# Patient Record
Sex: MALE | Race: WHITE | NOT HISPANIC OR LATINO
[De-identification: names, ages, dates, MRNs, and addresses within clinical notes are randomized per-mention and may not be internally consistent; named-entity substitution may affect disease eponyms.]

---

## 2018-08-03 ENCOUNTER — APPOINTMENT (OUTPATIENT)
Dept: PULMONOLOGY | Facility: CLINIC | Age: 68
End: 2018-08-03

## 2018-08-08 ENCOUNTER — RX RENEWAL (OUTPATIENT)
Age: 68
End: 2018-08-08

## 2018-09-17 ENCOUNTER — APPOINTMENT (OUTPATIENT)
Dept: PULMONOLOGY | Facility: CLINIC | Age: 68
End: 2018-09-17

## 2018-10-30 ENCOUNTER — APPOINTMENT (OUTPATIENT)
Dept: PULMONOLOGY | Facility: CLINIC | Age: 68
End: 2018-10-30
Payer: MEDICARE

## 2018-10-30 VITALS
DIASTOLIC BLOOD PRESSURE: 90 MMHG | HEART RATE: 94 BPM | SYSTOLIC BLOOD PRESSURE: 134 MMHG | HEIGHT: 73 IN | RESPIRATION RATE: 12 BRPM | OXYGEN SATURATION: 96 % | WEIGHT: 180 LBS | BODY MASS INDEX: 23.86 KG/M2

## 2018-10-30 DIAGNOSIS — K44.9 DIAPHRAGMATIC HERNIA W/OUT OBSTRUCTION OR GANGRENE: ICD-10-CM

## 2018-10-30 LAB — POCT - HEMOGLOBIN (HGB), QUANTITATIVE, TRANSCUTANEOUS: 17.1

## 2018-10-30 PROCEDURE — 88738 HGB QUANT TRANSCUTANEOUS: CPT

## 2018-10-30 PROCEDURE — 94060 EVALUATION OF WHEEZING: CPT

## 2018-10-30 PROCEDURE — 94729 DIFFUSING CAPACITY: CPT

## 2018-10-30 PROCEDURE — 99214 OFFICE O/P EST MOD 30 MIN: CPT | Mod: 25

## 2018-10-30 PROCEDURE — 94750: CPT

## 2018-10-30 PROCEDURE — 71046 X-RAY EXAM CHEST 2 VIEWS: CPT

## 2018-10-30 PROCEDURE — 94726 PLETHYSMOGRAPHY LUNG VOLUMES: CPT

## 2018-11-21 ENCOUNTER — APPOINTMENT (OUTPATIENT)
Dept: PULMONOLOGY | Facility: CLINIC | Age: 68
End: 2018-11-21

## 2019-01-21 ENCOUNTER — RX RENEWAL (OUTPATIENT)
Age: 69
End: 2019-01-21

## 2019-04-01 ENCOUNTER — NON-APPOINTMENT (OUTPATIENT)
Age: 69
End: 2019-04-01

## 2019-04-01 ENCOUNTER — APPOINTMENT (OUTPATIENT)
Dept: PULMONOLOGY | Facility: CLINIC | Age: 69
End: 2019-04-01
Payer: MEDICARE

## 2019-04-01 VITALS
HEART RATE: 70 BPM | TEMPERATURE: 98.7 F | SYSTOLIC BLOOD PRESSURE: 103 MMHG | OXYGEN SATURATION: 97 % | DIASTOLIC BLOOD PRESSURE: 73 MMHG

## 2019-04-01 PROCEDURE — 94060 EVALUATION OF WHEEZING: CPT

## 2019-04-01 PROCEDURE — 94727 GAS DIL/WSHOT DETER LNG VOL: CPT

## 2019-04-01 PROCEDURE — 99214 OFFICE O/P EST MOD 30 MIN: CPT | Mod: 25

## 2019-04-01 PROCEDURE — 93000 ELECTROCARDIOGRAM COMPLETE: CPT

## 2019-04-01 PROCEDURE — 94729 DIFFUSING CAPACITY: CPT

## 2019-04-01 RX ORDER — TIOTROPIUM BROMIDE AND OLODATEROL 3.124; 2.736 UG/1; UG/1
2.5-2.5 SPRAY, METERED RESPIRATORY (INHALATION) DAILY
Qty: 4 | Refills: 3 | Status: DISCONTINUED | COMMUNITY
Start: 2018-08-08 | End: 2019-04-01

## 2019-04-01 NOTE — PHYSICAL EXAM
[General Appearance - Well Developed] : well developed [General Appearance - Well Nourished] : well nourished [Normal Conjunctiva] : the conjunctiva exhibited no abnormalities [Normal Oropharynx] : normal oropharynx [Jugular Venous Distention Increased] : there was no jugular-venous distention [Thyroid Diffuse Enlargement] : the thyroid was not enlarged [Heart Sounds] : normal S1 and S2 [Murmurs] : no murmurs present [Edema] : no peripheral edema present [Auscultation Breath Sounds / Voice Sounds] : lungs were clear to auscultation bilaterally [Lungs Percussion] : the lungs were normal to percussion [Nail Clubbing] : no clubbing of the fingernails [Cyanosis, Localized] : no localized cyanosis [Petechial Hemorrhages (___cm)] : no petechial hemorrhages [] : no ischemic changes [FreeTextEntry1] : proolongation of expiration

## 2019-04-01 NOTE — HISTORY OF PRESENT ILLNESS
[FreeTextEntry1] : Few months notes [facility]. \par Family issues then starts significant increse in smoking. Also with allergies.\par Some back tightness.\par minimal cough. Denies wheeze.

## 2019-04-01 NOTE — PROCEDURE
[FreeTextEntry1] : Pulmonary function testing.\par These data demonstrate a moderate obstructive ventilatory deficit. There is no significant bronchodilator response. Normal Lung Volumes. There is a moderate diffusion impairment. \par \par EKG ST occ PVC\par \par CT no change

## 2019-04-01 NOTE — DISCUSSION/SUMMARY
[FreeTextEntry1] : Moderate COPD relatively stable. Still smoking\par \par Pulmonary nodules stable\par \par Sinus tachycardia. Took OTC stimulant inhaler. Advised not to use.

## 2019-04-01 NOTE — ASSESSMENT
[FreeTextEntry1] : Continue present bronchodilator therapy\par Add Spiriva does not want add. med at this time. \par \par Smoking cessation options and importance discussed with patient.\par \par CT 1 year\par \par F/U pmd tachycardia

## 2019-04-02 ENCOUNTER — RX RENEWAL (OUTPATIENT)
Age: 69
End: 2019-04-02

## 2019-10-08 ENCOUNTER — APPOINTMENT (OUTPATIENT)
Dept: PULMONOLOGY | Facility: CLINIC | Age: 69
End: 2019-10-08

## 2019-11-01 ENCOUNTER — RX RENEWAL (OUTPATIENT)
Age: 69
End: 2019-11-01

## 2020-01-10 ENCOUNTER — OTHER (OUTPATIENT)
Age: 70
End: 2020-01-10

## 2020-02-25 ENCOUNTER — APPOINTMENT (OUTPATIENT)
Dept: PULMONOLOGY | Facility: CLINIC | Age: 70
End: 2020-02-25
Payer: MEDICARE

## 2020-02-25 VITALS
OXYGEN SATURATION: 94 % | DIASTOLIC BLOOD PRESSURE: 87 MMHG | HEART RATE: 102 BPM | SYSTOLIC BLOOD PRESSURE: 119 MMHG | TEMPERATURE: 98.1 F

## 2020-02-25 LAB — POCT - HEMOGLOBIN (HGB), QUANTITATIVE, TRANSCUTANEOUS: 13.3

## 2020-02-25 PROCEDURE — 88738 HGB QUANT TRANSCUTANEOUS: CPT

## 2020-02-25 PROCEDURE — 94729 DIFFUSING CAPACITY: CPT

## 2020-02-25 PROCEDURE — 94727 GAS DIL/WSHOT DETER LNG VOL: CPT

## 2020-02-25 PROCEDURE — 99214 OFFICE O/P EST MOD 30 MIN: CPT | Mod: 25

## 2020-02-25 NOTE — PHYSICAL EXAM
[No Acute Distress] : no acute distress [Normal Oropharynx] : normal oropharynx [Normal Appearance] : normal appearance [Supple] : supple [Thyroid Not Enlarged] : thyroid not enlarged [No Neck Mass] : no neck mass [No JVD] : no jvd [Normal S1, S2] : normal s1, s2 [No Murmurs] : no murmurs

## 2020-02-26 NOTE — HISTORY OF PRESENT ILLNESS
[Current] : current [TextBox_4] : Here to review results of CT scan. \par Increase in SOB. Denies cough/wheeze. Having a difficult time taking a deep breath in. \par \par Had injured back and limited.\par \par Using symbicort 2 puffs/day [TextBox_11] : 1

## 2020-02-26 NOTE — PROCEDURE
[FreeTextEntry1] : 02/26/2020\par Pulmonary function testing\par These data demonstrate a moderate obstructive ventilatory deficit. There is no significant bronchodilator response. There is elevation in the RV/TLC ratio indicative of possible air trapping. There is a moderate diffusion impairment. \par \par CT reviewed.

## 2020-03-16 ENCOUNTER — RX RENEWAL (OUTPATIENT)
Age: 70
End: 2020-03-16

## 2020-06-23 ENCOUNTER — APPOINTMENT (OUTPATIENT)
Dept: PULMONOLOGY | Facility: CLINIC | Age: 70
End: 2020-06-23
Payer: MEDICARE

## 2020-06-23 VITALS
DIASTOLIC BLOOD PRESSURE: 79 MMHG | SYSTOLIC BLOOD PRESSURE: 139 MMHG | HEART RATE: 109 BPM | OXYGEN SATURATION: 98 % | TEMPERATURE: 98.8 F

## 2020-06-23 PROCEDURE — 99406 BEHAV CHNG SMOKING 3-10 MIN: CPT

## 2020-06-23 PROCEDURE — 71046 X-RAY EXAM CHEST 2 VIEWS: CPT

## 2020-06-23 PROCEDURE — 99213 OFFICE O/P EST LOW 20 MIN: CPT

## 2020-06-23 NOTE — PROCEDURE
[FreeTextEntry1] : 02/26/2020\par Pulmonary function testing\par These data demonstrate a moderate obstructive ventilatory deficit. There is no significant bronchodilator response. There is elevation in the RV/TLC ratio indicative of possible air trapping. There is a moderate diffusion impairment. \par \par CT reviewed. Jan 2020\par \par

## 2020-06-23 NOTE — ASSESSMENT
[FreeTextEntry1] : Discontinue smoking.\par Course of prednisone.\par Course of Zithromax.\par Continue present bronchodilator therapy\par Follow-up in 2 weeks or sooner on a as needed basis.\par \par \par \par Smoking cessation options and importance discussed with patient.\par \par \par

## 2020-06-23 NOTE — HISTORY OF PRESENT ILLNESS
[Current] : current [TextBox_4] : Feels like lungs cant expand, no wheeze no cough rare mucus\par using Symbicort 2 and 2 and feels not helping , albuterol no effect.\par has not tried for years to quit smoking, last time when was six years ago , very nervous\par Feels diaphragm rigid\par [place] 1-2 flights\par Minimal cough\par NO response to albuterol.\par Smoking. [TextBox_11] : 1.5 [TextBox_13] : 50

## 2020-06-23 NOTE — PHYSICAL EXAM
[No Acute Distress] : no acute distress [Normal Oropharynx] : normal oropharynx [Normal Appearance] : normal appearance [Supple] : supple [Thyroid Not Enlarged] : thyroid not enlarged [No Neck Mass] : no neck mass [No JVD] : no jvd [Normal S1, S2] : normal s1, s2 [No Murmurs] : no murmurs [No Abnormalities] : no abnormalities [Normal to Percussion] : normal to percussion [No Cyanosis] : no cyanosis [Benign] : benign [No Clubbing] : no clubbing [No Edema] : no edema [TextBox_68] : 1+ wheeze bilaterally with significant prolongation of expiration.

## 2020-06-23 NOTE — COUNSELING
[Risk of tobacco use and health benefits of smoking cessation discussed] : Risk of tobacco use and health benefits of smoking cessation discussed [Cessation strategies including cessation program discussed] : Cessation strategies including cessation program discussed [Use of nicotine replacement therapies and other medications discussed] : Use of nicotine replacement therapies and other medications discussed [Tobacco Use Cessation Intermediate Greater Than 3 Minutes Up to 10 Minutes] : Tobacco Use Cessation Intermediate Greater Than 3 Minutes Up to 10 Minutes

## 2020-07-14 DIAGNOSIS — Z20.828 CONTACT WITH AND (SUSPECTED) EXPOSURE TO OTHER VIRAL COMMUNICABLE DISEASES: ICD-10-CM

## 2020-07-21 ENCOUNTER — APPOINTMENT (OUTPATIENT)
Dept: PULMONOLOGY | Facility: CLINIC | Age: 70
End: 2020-07-21

## 2020-07-27 ENCOUNTER — APPOINTMENT (OUTPATIENT)
Dept: DISASTER EMERGENCY | Facility: CLINIC | Age: 70
End: 2020-07-27

## 2020-07-29 LAB — SARS-COV-2 N GENE NPH QL NAA+PROBE: NOT DETECTED

## 2020-07-31 ENCOUNTER — APPOINTMENT (OUTPATIENT)
Dept: PULMONOLOGY | Facility: CLINIC | Age: 70
End: 2020-07-31
Payer: MEDICARE

## 2020-07-31 VITALS
WEIGHT: 180 LBS | HEART RATE: 96 BPM | DIASTOLIC BLOOD PRESSURE: 79 MMHG | RESPIRATION RATE: 14 BRPM | SYSTOLIC BLOOD PRESSURE: 133 MMHG | BODY MASS INDEX: 23.86 KG/M2 | OXYGEN SATURATION: 97 % | HEIGHT: 73 IN

## 2020-07-31 PROCEDURE — 94729 DIFFUSING CAPACITY: CPT

## 2020-07-31 PROCEDURE — 94060 EVALUATION OF WHEEZING: CPT

## 2020-07-31 PROCEDURE — 99213 OFFICE O/P EST LOW 20 MIN: CPT | Mod: 25

## 2020-07-31 PROCEDURE — ZZZZZ: CPT

## 2020-07-31 PROCEDURE — 94727 GAS DIL/WSHOT DETER LNG VOL: CPT

## 2020-07-31 RX ORDER — AZITHROMYCIN 250 MG/1
250 TABLET, FILM COATED ORAL
Qty: 1 | Refills: 0 | Status: DISCONTINUED | COMMUNITY
Start: 2020-06-23 | End: 2020-07-31

## 2020-07-31 RX ORDER — PREDNISONE 10 MG/1
10 TABLET ORAL
Qty: 40 | Refills: 0 | Status: DISCONTINUED | COMMUNITY
Start: 2020-06-23 | End: 2020-07-31

## 2020-07-31 NOTE — COUNSELING
[Risk of tobacco use and health benefits of smoking cessation discussed] : Risk of tobacco use and health benefits of smoking cessation discussed [Cessation strategies including cessation program discussed] : Cessation strategies including cessation program discussed [Use of nicotine replacement therapies and other medications discussed] : Use of nicotine replacement therapies and other medications discussed

## 2020-08-04 NOTE — ASSESSMENT
[FreeTextEntry1] : Discontinue smoking.  Urged.  Patient with progressive COPD.\par Continue present bronchodilator therapy\par .add spiriva 2 puffs daily gave samples\par \par Smoking cessation options and importance discussed with patient.\par r/t 3 months [place]\par Ct f/u Jan 2021\par \par

## 2020-08-04 NOTE — PROCEDURE
[FreeTextEntry1] : 07/31/2020\par Pulmonary function testing\par These data demonstrate a moderate obstructive ventilatory deficit. There is no significant bronchodilator response. There is elevation in the RV/TLC ratio indicative of possible air trapping. There is a moderate diffusion impairment. \par \par CT Jan 2020\par \par

## 2020-08-04 NOTE — PHYSICAL EXAM
[No Acute Distress] : no acute distress [Normal Oropharynx] : normal oropharynx [Normal Appearance] : normal appearance [Supple] : supple [Thyroid Not Enlarged] : thyroid not enlarged [No Neck Mass] : no neck mass [No JVD] : no jvd [Normal S1, S2] : normal s1, s2 [Normal to Percussion] : normal to percussion [No Abnormalities] : no abnormalities [No Murmurs] : no murmurs [No Clubbing] : no clubbing [Benign] : benign [No Cyanosis] : no cyanosis [No Edema] : no edema [TextBox_68] : Prolongation of expiration without active wheeze.

## 2020-08-04 NOTE — HISTORY OF PRESENT ILLNESS
[Current] : current [TextBox_4] : no wheeze  rare mucus took prednisone and antibiotic with help while on it but once was over felt breathing went back to the same sob\par using Symbicort 2 and 2  prn albuterol\par has not tried for years to quit smoking, last time when was six years ago , very nervous, still smoking chain smoking\par Feels diaphragm rigid\par [place] 1-2 flights\par Minimal cough\par NO response to albuterol.\par  [TextBox_13] : 50 [TextBox_11] : 1.5

## 2020-08-04 NOTE — PHYSICAL EXAM
[Normal Oropharynx] : normal oropharynx [No Acute Distress] : no acute distress [Supple] : supple [Thyroid Not Enlarged] : thyroid not enlarged [Normal Appearance] : normal appearance [No Neck Mass] : no neck mass [No JVD] : no jvd [Normal S1, S2] : normal s1, s2 [No Murmurs] : no murmurs [Normal to Percussion] : normal to percussion [No Abnormalities] : no abnormalities [Benign] : benign [No Clubbing] : no clubbing [No Cyanosis] : no cyanosis [No Edema] : no edema [TextBox_68] : Prolongation of expiration without active wheeze.

## 2020-08-04 NOTE — HISTORY OF PRESENT ILLNESS
[Current] : current [TextBox_4] : no wheeze  rare mucus took prednisone and antibiotic with help while on it but once was over felt breathing went back to the same sob\par using Symbicort 2 and 2  prn albuterol\par has not tried for years to quit smoking, last time when was six years ago , very nervous, still smoking chain smoking\par Feels diaphragm rigid\par [place] 1-2 flights\par Minimal cough\par NO response to albuterol.\par  [TextBox_11] : 1.5 [TextBox_13] : 50

## 2020-08-04 NOTE — DISCUSSION/SUMMARY
[FreeTextEntry1] : COPD progressive\par Status post exacerbation with resolution of wheezing.\par Current every day smoker.

## 2020-08-11 ENCOUNTER — RX RENEWAL (OUTPATIENT)
Age: 70
End: 2020-08-11

## 2020-10-05 ENCOUNTER — RX RENEWAL (OUTPATIENT)
Age: 70
End: 2020-10-05

## 2020-12-04 ENCOUNTER — APPOINTMENT (OUTPATIENT)
Dept: PULMONOLOGY | Facility: CLINIC | Age: 70
End: 2020-12-04
Payer: MEDICARE

## 2020-12-04 VITALS
OXYGEN SATURATION: 99 % | BODY MASS INDEX: 23.86 KG/M2 | HEIGHT: 73 IN | TEMPERATURE: 98.8 F | WEIGHT: 180 LBS | SYSTOLIC BLOOD PRESSURE: 135 MMHG | HEART RATE: 96 BPM | DIASTOLIC BLOOD PRESSURE: 91 MMHG

## 2020-12-04 DIAGNOSIS — J44.1 CHRONIC OBSTRUCTIVE PULMONARY DISEASE WITH (ACUTE) EXACERBATION: ICD-10-CM

## 2020-12-04 PROCEDURE — 99214 OFFICE O/P EST MOD 30 MIN: CPT

## 2020-12-04 NOTE — PHYSICAL EXAM
[No Acute Distress] : no acute distress [Normal Oropharynx] : normal oropharynx [Normal Appearance] : normal appearance [Supple] : supple [Thyroid Not Enlarged] : thyroid not enlarged [No Neck Mass] : no neck mass [No JVD] : no jvd [Normal S1, S2] : normal s1, s2 [No Murmurs] : no murmurs [Normal to Percussion] : normal to percussion [No Abnormalities] : no abnormalities [Benign] : benign [No Clubbing] : no clubbing [No Cyanosis] : no cyanosis [No Edema] : no edema [TextBox_68] : Prolongation of expiration without active wheeze.

## 2020-12-04 NOTE — CONSULT LETTER
[Dear  ___] : Dear ~TAMIKA, [Consult Letter:] : I had the pleasure of evaluating your patient, [unfilled]. [Please see my note below.] : Please see my note below. [Sincerely,] : Sincerely, [FreeTextEntry2] : Roddy Patino MD\par  [FreeTextEntry3] : Goldy Jacob MD FCCP\par

## 2020-12-04 NOTE — DISCUSSION/SUMMARY
[FreeTextEntry1] : COPD progressive\par Current every day smoker.\par Ground glass opacity likely related to inhomogeneous ventilation and atelectasis.

## 2020-12-04 NOTE — REASON FOR VISIT
[Acute] : an acute visit [Abnormal CXR/ Chest CT] : an abnormal CXR/ chest CT [COPD] : COPD [Pulmonary Nodules] : pulmonary nodules [Shortness of Breath] : shortness of breath

## 2020-12-04 NOTE — PROCEDURE
[FreeTextEntry1] : 07/31/2020\par Pulmonary function testing\par These data demonstrate a moderate obstructive ventilatory deficit. There is no significant bronchodilator response. There is elevation in the RV/TLC ratio indicative of possible air trapping. There is a moderate diffusion impairment. \par \par reviewed and discussed recent ct 10/22/2020.  Compared to prior\par \par

## 2020-12-04 NOTE — HISTORY OF PRESENT ILLNESS
[Current] : current [TextBox_4] : past few months breathing has increasing getting worse, cant get up 2 flights of stairs\par has not tried for years to quit smoking, last time when was six years ago , very nervous, still smoking chain smoking about 1 ppd\par Feels diaphragm rigid\par \par Due for prostate biopsy in January, Dr Carpenter sent him for a ct chest and brought in report\par Recently having lower back problems pain down right leg, seeing Dr Patino afraid to get a Mri\par Minimal cough, very little mucus\par No response to albuterol.\par Last visit got prednisone with help in breathing\par no recent illness \par only on Symbicort and ran out of spiriva\par Positive BARRERA. \par refuses flu shot\par \par Feels not going to stop smoking. [TextBox_11] : 1.5 [TextBox_13] : 50

## 2020-12-04 NOTE — ASSESSMENT
[FreeTextEntry1] : Discontinue smoking.  Urged.  Patient with progressive COPD.\par Continue present bronchodilator therapy\par Add spiriva 2 puffs daily gave samples\par Desires prednisone 5 mg/day. Feels much better on. Risks and benefits discussed. For trial\par For PFT and 6 minute walk\par F/U CT 6-9 months

## 2020-12-06 LAB — SARS-COV-2 N GENE NPH QL NAA+PROBE: NOT DETECTED

## 2020-12-09 ENCOUNTER — APPOINTMENT (OUTPATIENT)
Dept: PULMONOLOGY | Facility: CLINIC | Age: 70
End: 2020-12-09

## 2021-08-02 ENCOUNTER — APPOINTMENT (OUTPATIENT)
Dept: PULMONOLOGY | Facility: CLINIC | Age: 71
End: 2021-08-02
Payer: MEDICARE

## 2021-08-02 VITALS
DIASTOLIC BLOOD PRESSURE: 74 MMHG | OXYGEN SATURATION: 96 % | SYSTOLIC BLOOD PRESSURE: 129 MMHG | TEMPERATURE: 98.3 F | HEART RATE: 96 BPM

## 2021-08-02 DIAGNOSIS — R91.8 OTHER NONSPECIFIC ABNORMAL FINDING OF LUNG FIELD: ICD-10-CM

## 2021-08-02 PROCEDURE — 99214 OFFICE O/P EST MOD 30 MIN: CPT | Mod: 25

## 2021-08-02 PROCEDURE — 71046 X-RAY EXAM CHEST 2 VIEWS: CPT

## 2021-08-02 RX ORDER — OXYCODONE AND ACETAMINOPHEN 5; 325 MG/1; MG/1
5-325 TABLET ORAL
Qty: 50 | Refills: 0 | Status: ACTIVE | COMMUNITY
Start: 2021-07-14

## 2021-08-02 RX ORDER — MELOXICAM 15 MG/1
15 TABLET ORAL
Qty: 30 | Refills: 0 | Status: DISCONTINUED | COMMUNITY
Start: 2021-03-15 | End: 2021-08-02

## 2021-08-03 NOTE — DISCUSSION/SUMMARY
[FreeTextEntry1] : COPD progressive\par Current every day smoker.\par Ground glass opacity likely related to inhomogeneous ventilation and atelectasis.\par Generalized fatigue etiology not clear.

## 2021-08-03 NOTE — HISTORY OF PRESENT ILLNESS
MRN:[id number]                      After Visit Summary   3/28/2017    Mary Shipman    MRN: [id number]           Thank you!     Thank you for choosing [facility] for your care. Our goal is always to provide you with excellent care. Hearing back from our patients is one way we can continue to improve our services. Please take a few minutes to complete the written survey that you may receive in the mail after you visit with us. Thank you!        Patient Information     Date Of Birth          1985        About your hospital stay     You were admitted on:  March 28, 2017 You last received care in the:  [address] [facility]    You were discharged on:  March 29, 2017        Reason for your hospital stay       Seizure                  Who to Call     For medical emergencies, please call 911.  For non-urgent questions about your medical care, please call your primary care provider or clinic, [phone]          Attending Provider     Provider Specialty    Autumn, Blessing Cardona MD Neurology       Primary Care Provider Office Phone # Fax #    Miguel Hammer PA-C [phone]       [address] [address]        After Care Instructions     Activity       Your activity upon discharge: activity as tolerated            Diet       Follow this diet upon discharge: Orders Placed This Encounter      Advance Diet as Tolerated: Full Liquid Diet            Discharge Instructions       1) Take keppra 750mg twice daily for seizures.  2) If still feeling side effects of sedation/sleepiness, then consider decreasing or stopping topiramate and/or trazadone which can worsen these side effects.   3) Follow-up in Neurology clinic at [facility] regarding seizures & follow-up on pending tests.  4) Follow-up with PT & OT as outpatient for ongoing therapies.                  Follow-up Appointments     Adult [facility] Follow-up and recommended labs and tests        Follow-up in Neurology Clinic at [facility] in 2-3 weeks.    Appointments on [place] and/or [facility] (with [facility] or [facility] provider or service). Call [phone] if you haven't heard regarding these appointments within 7 days of discharge.                  Additional Services     NEUROLOGY ADULT REFERRAL       Your provider has referred you to: [facility]: Neurology Clinic [facility] [phone]   [email or web address]  Epilepsy or Resident clinic (Thomas Rabago Ho, [place])    Reason for Referral: Consult, post-hospital follow-up.     Please schedule follow-up in 2-3 weeks.    Please be aware that coverage of these services is subject to the terms and limitations of your health insurance plan.  Call member services at your health plan with any benefit or coverage questions.      Please bring the following with you to your appointment:    (1) Any X-Rays, CTs or MRIs which have been performed.  Contact the facility where they were done to arrange for  prior to your scheduled appointment.    (2) List of current medications  (3) This referral request   (4) Any documents/labs given to you for this referral            Occupational Therapy Referral           Physical Therapy Referral       *This therapy referral will be filtered to a centralized scheduling office at [facility] and the patient will receive a call to schedule an appointment at a [facility] location most convenient for them. *     [facility] provides Physical Therapy evaluation and treatment and many specialty services across the [facility] system.  If requesting a specialty program, please choose from the list below.    If you have not heard from the scheduling office within 2 business days, please call [phone] for all locations, with the exception of [place], please call [phone].  Treatment: Evaluation & Treatment  Special Instructions/Modalities: n/a  Special  "Programs: None    Please be aware that coverage of these services is subject to the terms and limitations of your health insurance plan.  Call member services at your health plan with any benefit or coverage questions.      **Note to Provider:  If you are referring outside of [place] for the therapy appointment, please list the name of the location in the  special instructions  above, print the referral and give to the patient to schedule the appointment.                  Pending Results     Date and Time Order Name Status Description    3/29/2017 0955 Angiotensin Converting Enzyme CSF In process     3/29/2017 0903 Viral culture In process     3/29/2017 0903 Oligoclonal banding In process     3/29/2017 0903 HSV Types 1 and 2 Qualitative PCR CSF: Tube 2 In process     3/29/2017 0903 CSF Culture Aerobic Bacterial In process     3/29/2017 0903 Gram stain In process             Statement of Approval     Ordered          03/29/17 1623  I have reviewed and agree with all the recommendations and orders detailed in this document.  EFFECTIVE NOW     Approved and electronically signed by:  Randy Olea MD             Admission Information     Date & Time Provider Department Dept. Phone    3/28/2017 Blessing Leyva MD [address] [facility] [place] [phone]      Your Vitals Were     Blood Pressure Pulse Temperature Respirations Height Weight    110/65 82 98.7  F (37.1  C) (Oral) 18 1.651 m (5' 5\") 110.2 kg (243 lb)    Pulse Oximetry BMI (Body Mass Index)                97% 40.44 kg/m2          MyChart Information     [employer] gives you secure access to your electronic health record. If you see a primary care provider, you can also send messages to your care team and make appointments. If you have questions, please call your primary care clinic.  If you do not have a primary care provider, please call [phone] and they will assist you.        Care EveryWhere ID     This is your Care EveryWhere ID. This could " be used by other organizations to access your [place] medical records  [id number]           Review of your medicines      START taking        Dose / Directions    levETIRAcetam 750 MG tablet   Commonly known as:  KEPPRA        Dose:  750 mg   Take 1 tablet (750 mg) by mouth 2 times daily   Quantity:  180 tablet   Refills:  0         CONTINUE these medicines which have NOT CHANGED        Dose / Directions    albuterol 108 (90 BASE) MCG/ACT Inhaler   Commonly known as:  PROAIR HFA/PROVENTIL HFA/VENTOLIN HFA   Used for:  Intermittent asthma, uncomplicated        Dose:  2 puff   Inhale 2 puffs into the lungs every 6 hours as needed for shortness of breath / dyspnea or wheezing   Quantity:  3 Inhaler   Refills:  0       fexofenadine-pseudoePHEDrine 180-240 MG per 24 hr tablet   Commonly known as:  ALLEGRA-D 24        Dose:  1 tablet   Take 1 tablet by mouth daily   Refills:  0       fluticasone 50 MCG/ACT spray   Commonly known as:  FLONASE        Dose:  1-2 spray   Spray 1-2 sprays into both nostrils daily as needed for rhinitis or allergies   Refills:  0       ibuprofen 400-800 mg tablet   Commonly known as:  ADVIL,MOTRIN   Used for:  Abdominal pain        Dose:  400-800 mg   Take 1-2 tablets by mouth every 6 hours as needed (cramping).   Quantity:  30 tablet   Refills:  0       LANsoprazole 30 MG CR capsule   Commonly known as:  PREVACID   Used for:  Gastroesophageal reflux disease without esophagitis        Dose:  30 mg   Take 1 capsule (30 mg) by mouth daily Take 30-60 minutes before a meal.   Quantity:  90 capsule   Refills:  0       levalbuterol 1.25 MG/0.5ML Nebu neb solution   Commonly known as:  XOPENEX CONC        Dose:  1.25 mg   Take 1.25 mg by nebulization every 6 hours as needed for wheezing   Refills:  0       metFORMIN 500 MG 24 hr tablet   Commonly known as:  GLUCOPHAGE-XR   Used for:  Polycystic ovaries        Dose:  500 mg   Take 1 tablet (500 mg) by mouth 2 times daily (with meals)   Quantity:   180 tablet   Refills:  3       mometasone-formoterol 200-5 MCG/ACT oral inhaler   Commonly known as:  DULERA   Used for:  Intermittent asthma, uncomplicated        Dose:  2 puff   Inhale 2 puffs into the lungs 2 times daily   Quantity:  39 g   Refills:  0       montelukast 10 MG tablet   Commonly known as:  SINGULAIR   Used for:  Intermittent asthma, uncomplicated        Dose:  10 mg   Take 1 tablet (10 mg) by mouth At Bedtime   Quantity:  90 tablet   Refills:  1       polyethylene glycol Packet   Commonly known as:  MIRALAX/GLYCOLAX        Dose:  17 g   Take 17 g by mouth daily as needed for constipation   Refills:  0       ranitidine 150 MG tablet   Commonly known as:  ZANTAC        Dose:  150 mg   Take 150 mg by mouth 2 times daily as needed   Quantity:  60 tablet   Refills:  1       SERTRALINE HCL PO        Dose:  150 mg   Take 150 mg by mouth daily 100mg x 1.5 tabs   Refills:  0       topiramate 25 MG tablet   Commonly known as:  TOPAMAX   Used for:  Other migraine without status migrainosus, not intractable        Take 1 tablet (25 mg) at bedtime for 1 week, then 1 tablet twice daily for 1 week, then 1 tablet in AM and 2 in PM for 1 week, then 2 tablets twice daily.   Quantity:  70 tablet   Refills:  0       TRAZODONE HCL PO        Dose:   mg   Take  mg by mouth At Bedtime   Refills:  0            Where to get your medicines      These medications were sent to [facility] [place], [place] - [address] [address]     Phone:  [phone]     levETIRAcetam 750 MG tablet                Protect others around you: Learn how to safely use, store and throw away your medicines at www.disposemymeds.org.             Medication List: This is a list of all your medications and when to take them. Check marks below indicate your daily home schedule. Keep this list as a reference.      Medications           Morning Afternoon Evening Bedtime As Needed     albuterol 108 (90 BASE) MCG/ACT Inhaler   Commonly known as:  PROAIR HFA/PROVENTIL HFA/VENTOLIN HFA   Inhale 2 puffs into the lungs every 6 hours as needed for shortness of breath / dyspnea or wheezing                                fexofenadine-pseudoePHEDrine 180-240 MG per 24 hr tablet   Commonly known as:  ALLEGRA-D 24   Take 1 tablet by mouth daily   Last time this was given:  1 tablet on 3/29/2017  9:02 AM                                fluticasone 50 MCG/ACT spray   Commonly known as:  FLONASE   Spray 1-2 sprays into both nostrils daily as needed for rhinitis or allergies                                ibuprofen 400-800 mg tablet   Commonly known as:  ADVIL,MOTRIN   Take 1-2 tablets by mouth every 6 hours as needed (cramping).                                LANsoprazole 30 MG CR capsule   Commonly known as:  PREVACID   Take 1 capsule (30 mg) by mouth daily Take 30-60 minutes before a meal.   Last time this was given:  30 mg on 3/29/2017  9:01 AM                                levalbuterol 1.25 MG/0.5ML Nebu neb solution   Commonly known as:  XOPENEX CONC   Take 1.25 mg by nebulization every 6 hours as needed for wheezing                                levETIRAcetam 750 MG tablet   Commonly known as:  KEPPRA   Take 1 tablet (750 mg) by mouth 2 times daily   Last time this was given:  1,000 mg on 3/29/2017  9:03 AM                                metFORMIN 500 MG 24 hr tablet   Commonly known as:  GLUCOPHAGE-XR   Take 1 tablet (500 mg) by mouth 2 times daily (with meals)   Last time this was given:  500 mg on 3/29/2017  9:01 AM                                mometasone-formoterol 200-5 MCG/ACT oral inhaler   Commonly known as:  DULERA   Inhale 2 puffs into the lungs 2 times daily                                montelukast 10 MG tablet   Commonly known as:  SINGULAIR   Take 1 tablet (10 mg) by mouth At Bedtime   Last time this was given:  10 mg on 3/28/2017  9:34 PM                                polyethylene  glycol Packet   Commonly known as:  MIRALAX/GLYCOLAX   Take 17 g by mouth daily as needed for constipation                                ranitidine 150 MG tablet   Commonly known as:  ZANTAC   Take 150 mg by mouth 2 times daily as needed                                SERTRALINE HCL PO   Take 150 mg by mouth daily 100mg x 1.5 tabs   Last time this was given:  150 mg on 3/29/2017  9:01 AM                                topiramate 25 MG tablet   Commonly known as:  TOPAMAX   Take 1 tablet (25 mg) at bedtime for 1 week, then 1 tablet twice daily for 1 week, then 1 tablet in AM and 2 in PM for 1 week, then 2 tablets twice daily.   Last time this was given:  25 mg on 3/28/2017  9:34 PM                                TRAZODONE HCL PO   Take  mg by mouth At Bedtime                                   [TextBox_4] : increase in sob for past 3 months, feels it happened after the Moderna shot, cant get up 2 flights of stairs\par feeling exhausted\par Smoking.\par using Symbicort only, not using spiriva, thought you could not use them together\par still on prednisone 5 mg for past 2 months\par \par

## 2021-08-03 NOTE — PROCEDURE
[FreeTextEntry1] : \par \par \par 07/31/2020\par Pulmonary function testing\par These data demonstrate a moderate obstructive ventilatory deficit. There is no significant bronchodilator response. There is elevation in the RV/TLC ratio indicative of possible air trapping. There is a moderate diffusion impairment. \par \par reviewed and discussed recent ct 10/22/2020.  Compared to prior\par \par

## 2021-08-11 ENCOUNTER — APPOINTMENT (OUTPATIENT)
Dept: PULMONOLOGY | Facility: CLINIC | Age: 71
End: 2021-08-11
Payer: MEDICARE

## 2021-08-11 ENCOUNTER — NON-APPOINTMENT (OUTPATIENT)
Age: 71
End: 2021-08-11

## 2021-08-11 VITALS
HEART RATE: 137 BPM | HEIGHT: 73 IN | DIASTOLIC BLOOD PRESSURE: 91 MMHG | OXYGEN SATURATION: 96 % | TEMPERATURE: 97.5 F | SYSTOLIC BLOOD PRESSURE: 127 MMHG | RESPIRATION RATE: 17 BRPM

## 2021-08-11 VITALS — HEART RATE: 100 BPM

## 2021-08-11 DIAGNOSIS — R00.0 TACHYCARDIA, UNSPECIFIED: ICD-10-CM

## 2021-08-11 LAB — POCT - HEMOGLOBIN (HGB), QUANTITATIVE, TRANSCUTANEOUS: 17.2

## 2021-08-11 PROCEDURE — 94010 BREATHING CAPACITY TEST: CPT

## 2021-08-11 PROCEDURE — 99214 OFFICE O/P EST MOD 30 MIN: CPT | Mod: 25

## 2021-08-11 PROCEDURE — 36415 COLL VENOUS BLD VENIPUNCTURE: CPT

## 2021-08-11 PROCEDURE — ZZZZZ: CPT

## 2021-08-11 PROCEDURE — 88738 HGB QUANT TRANSCUTANEOUS: CPT

## 2021-08-11 PROCEDURE — 94726 PLETHYSMOGRAPHY LUNG VOLUMES: CPT

## 2021-08-11 PROCEDURE — 94729 DIFFUSING CAPACITY: CPT

## 2021-08-11 PROCEDURE — 93000 ELECTROCARDIOGRAM COMPLETE: CPT

## 2021-08-11 NOTE — HISTORY OF PRESENT ILLNESS
[TextBox_4] : Has appt. with Dr Patino august 16 th\par Having persistent dyspnea on exertion.\par No cough\par Continues smoking\par Notes tachycardia with exertion.\par Denies chest discomfort.

## 2021-08-11 NOTE — PROCEDURE
[FreeTextEntry1] : Ct chest 8/5/2021 reviewed and discussed with pt\par \par \par Electrocardiogram reveals sinus tachycardia\par \par 08/11/2021\par Pulmonary function testing\par These data demonstrate a moderate obstructive ventilatory deficit. There is evidence of mild hyperinflation. Airway resistance is significantly increased. There is a moderate diffusion impairment. \par Compared to prior study of July 31, 2020 there is mild decrement in FEV1 increase in FVC and no significant change in diffusion capacity.\par \par \par

## 2021-08-11 NOTE — DISCUSSION/SUMMARY
[FreeTextEntry1] : COPD progressive\par Current every day smoker.  Continues to smoke.\par pulmonary nodules.\par Mild bronchiectasis.\par Generalized fatigue etiology not clear.\par Sinus tachycardia.

## 2021-08-11 NOTE — ASSESSMENT
[FreeTextEntry1] : Discontinue smoking.  Urged.  Patient with progressive COPD.\par Continue present bronchodilator therapy\par cont spiriva, symbicort\par Continue prednisone 5 mg/day.  \par Cardiology evaluation as soon as possible.\par Send CBC and D-dimer.\par \par Follow-up with primary care doctor for routine labs and evaluation.

## 2021-08-13 LAB
BASOPHILS # BLD AUTO: 0.04 K/UL
BASOPHILS NFR BLD AUTO: 0.7 %
DEPRECATED D DIMER PPP IA-ACNC: <150 NG/ML DDU
EOSINOPHIL # BLD AUTO: 0.03 K/UL
EOSINOPHIL NFR BLD AUTO: 0.5 %
HCT VFR BLD CALC: 46.6 %
HGB BLD-MCNC: 16.1 G/DL
IMM GRANULOCYTES NFR BLD AUTO: 0.3 %
LYMPHOCYTES # BLD AUTO: 1.94 K/UL
LYMPHOCYTES NFR BLD AUTO: 31.9 %
MAN DIFF?: NORMAL
MCHC RBC-ENTMCNC: 33.7 PG
MCHC RBC-ENTMCNC: 34.5 GM/DL
MCV RBC AUTO: 97.5 FL
MONOCYTES # BLD AUTO: 0.45 K/UL
MONOCYTES NFR BLD AUTO: 7.4 %
NEUTROPHILS # BLD AUTO: 3.6 K/UL
NEUTROPHILS NFR BLD AUTO: 59.2 %
PLATELET # BLD AUTO: 255 K/UL
RBC # BLD: 4.78 M/UL
RBC # FLD: 14.7 %
WBC # FLD AUTO: 6.08 K/UL

## 2021-09-14 ENCOUNTER — RX RENEWAL (OUTPATIENT)
Age: 71
End: 2021-09-14

## 2021-10-05 ENCOUNTER — APPOINTMENT (OUTPATIENT)
Dept: ELECTROPHYSIOLOGY | Facility: CLINIC | Age: 71
End: 2021-10-05

## 2021-10-28 ENCOUNTER — APPOINTMENT (OUTPATIENT)
Dept: UROLOGY | Facility: CLINIC | Age: 71
End: 2021-10-28
Payer: MEDICARE

## 2021-10-28 VITALS
SYSTOLIC BLOOD PRESSURE: 129 MMHG | TEMPERATURE: 98.7 F | HEART RATE: 92 BPM | DIASTOLIC BLOOD PRESSURE: 87 MMHG | WEIGHT: 180 LBS | BODY MASS INDEX: 23.86 KG/M2 | HEIGHT: 73 IN | RESPIRATION RATE: 17 BRPM

## 2021-10-28 DIAGNOSIS — Z85.51 PERSONAL HISTORY OF MALIGNANT NEOPLASM OF BLADDER: ICD-10-CM

## 2021-10-28 PROCEDURE — 99204 OFFICE O/P NEW MOD 45 MIN: CPT

## 2021-10-28 PROCEDURE — 99406 BEHAV CHNG SMOKING 3-10 MIN: CPT

## 2021-10-28 RX ORDER — DIAZEPAM 5 MG/1
5 TABLET ORAL
Qty: 1 | Refills: 0 | Status: ACTIVE | COMMUNITY
Start: 2021-10-28 | End: 1900-01-01

## 2021-10-29 LAB
APPEARANCE: CLEAR
BACTERIA: NEGATIVE
BILIRUBIN URINE: NEGATIVE
BLOOD URINE: NEGATIVE
CALCIUM OXALATE CRYSTALS: ABNORMAL
COLOR: YELLOW
GLUCOSE QUALITATIVE U: NEGATIVE
HYALINE CASTS: 0 /LPF
KETONES URINE: NEGATIVE
LEUKOCYTE ESTERASE URINE: NEGATIVE
MICROSCOPIC-UA: NORMAL
NITRITE URINE: NEGATIVE
PH URINE: 6
PROTEIN URINE: NORMAL
RED BLOOD CELLS URINE: 1 /HPF
SPECIFIC GRAVITY URINE: 1.02
SQUAMOUS EPITHELIAL CELLS: 4 /HPF
UROBILINOGEN URINE: NORMAL
WHITE BLOOD CELLS URINE: 1 /HPF

## 2021-10-30 LAB — URINE CYTOLOGY: NORMAL

## 2021-11-01 LAB — BACTERIA UR CULT: NORMAL

## 2021-11-10 ENCOUNTER — APPOINTMENT (OUTPATIENT)
Dept: PULMONOLOGY | Facility: CLINIC | Age: 71
End: 2021-11-10

## 2021-12-10 ENCOUNTER — OUTPATIENT (OUTPATIENT)
Dept: OUTPATIENT SERVICES | Facility: HOSPITAL | Age: 71
LOS: 1 days | End: 2021-12-10
Payer: MEDICARE

## 2021-12-10 ENCOUNTER — APPOINTMENT (OUTPATIENT)
Dept: UROLOGY | Facility: CLINIC | Age: 71
End: 2021-12-10
Payer: MEDICARE

## 2021-12-10 DIAGNOSIS — R35.0 FREQUENCY OF MICTURITION: ICD-10-CM

## 2021-12-10 DIAGNOSIS — R97.20 ELEVATED PROSTATE, SPECIFIC ANTIGEN [PSA]: ICD-10-CM

## 2021-12-10 DIAGNOSIS — Z85.51 PERSONAL HISTORY OF MALIGNANT NEOPLASM OF BLADDER: ICD-10-CM

## 2021-12-10 DIAGNOSIS — R97.20 ELEVATED PROSTATE SPECIFIC ANTIGEN [PSA]: ICD-10-CM

## 2021-12-10 DIAGNOSIS — F17.200 NICOTINE DEPENDENCE, UNSPECIFIED, UNCOMPLICATED: ICD-10-CM

## 2021-12-10 PROCEDURE — 52000 CYSTOURETHROSCOPY: CPT

## 2021-12-10 PROCEDURE — 99406 BEHAV CHNG SMOKING 3-10 MIN: CPT

## 2021-12-10 PROCEDURE — 99214 OFFICE O/P EST MOD 30 MIN: CPT | Mod: 25

## 2021-12-15 ENCOUNTER — APPOINTMENT (OUTPATIENT)
Dept: PULMONOLOGY | Facility: CLINIC | Age: 71
End: 2021-12-15
Payer: MEDICARE

## 2021-12-15 VITALS
OXYGEN SATURATION: 97 % | DIASTOLIC BLOOD PRESSURE: 97 MMHG | TEMPERATURE: 98.2 F | SYSTOLIC BLOOD PRESSURE: 155 MMHG | HEART RATE: 100 BPM

## 2021-12-15 PROCEDURE — 99213 OFFICE O/P EST LOW 20 MIN: CPT

## 2021-12-15 RX ORDER — ALBUTEROL SULFATE 90 UG/1
108 (90 BASE) INHALANT RESPIRATORY (INHALATION)
Qty: 1 | Refills: 5 | Status: DISCONTINUED | COMMUNITY
Start: 2019-04-02 | End: 2021-12-15

## 2021-12-15 RX ORDER — ALBUTEROL SULFATE 90 UG/1
108 (90 BASE) AEROSOL, METERED RESPIRATORY (INHALATION)
Qty: 3 | Refills: 1 | Status: ACTIVE | COMMUNITY
Start: 2021-12-15 | End: 1900-01-01

## 2021-12-15 RX ORDER — ALBUTEROL SULFATE 90 UG/1
108 (90 BASE) INHALANT RESPIRATORY (INHALATION)
Qty: 8.5 | Refills: 0 | Status: DISCONTINUED | COMMUNITY
Start: 2020-08-11 | End: 2021-12-15

## 2021-12-15 NOTE — PROCEDURE
[FreeTextEntry1] : Ct chest August 2021 reviewed\par \par \par 07/31/2020\par Pulmonary function testing\par These data demonstrate a moderate obstructive ventilatory deficit. There is no significant bronchodilator response. There is elevation in the RV/TLC ratio indicative of possible air trapping. There is a moderate diffusion impairment. \par \par reviewed and discussed recent ct 10/22/2020.  Compared to prior\par \par

## 2021-12-15 NOTE — PHYSICAL EXAM
[No Acute Distress] : no acute distress [Normal Oropharynx] : normal oropharynx [Normal Appearance] : normal appearance [Supple] : supple [Thyroid Not Enlarged] : thyroid not enlarged [No Neck Mass] : no neck mass [No JVD] : no jvd [Normal S1, S2] : normal s1, s2 [No Murmurs] : no murmurs [Normal to Percussion] : normal to percussion [No Abnormalities] : no abnormalities [Benign] : benign [No Clubbing] : no clubbing [No Cyanosis] : no cyanosis [No Edema] : no edema [TextBox_68] : Prolongation of expiration with occ rhonchi and wheeze.

## 2021-12-15 NOTE — DISCUSSION/SUMMARY
[FreeTextEntry1] : COPD progressive\par Current every day smoker.\par pulmonary nodules\par mild bronchiectasis \par

## 2021-12-15 NOTE — HISTORY OF PRESENT ILLNESS
[TextBox_4] : increase in sob for past 6 months, feels it happened after the Moderna shot, cant get up 1 Flights of stairs getting worse\par had labs and heart checked from Dr Carpenter\par Smoking. same no decrease\par using Symbicort did use the spiriva samples, no longer\par ran out of prednisone 2 months ago, was taking low dose\par no cough or mucus, slight wheeze\par has lower back stenosis with pain\par \par \par refuses flu shot\par \par plans to get moderna booster\par \par

## 2021-12-15 NOTE — ASSESSMENT
[FreeTextEntry1] : Discontinue smoking.  Urged.  Patient with progressive COPD.\par Trial Breztri 2 puffs bid samples \par For PFT and 6 minute walk on r/t 6 weeks\par ct chest 1 year from last 8/5/21\par

## 2022-01-20 ENCOUNTER — APPOINTMENT (OUTPATIENT)
Dept: PULMONOLOGY | Facility: CLINIC | Age: 72
End: 2022-01-20
Payer: MEDICARE

## 2022-01-20 ENCOUNTER — RESULT CHARGE (OUTPATIENT)
Age: 72
End: 2022-01-20

## 2022-01-20 VITALS
DIASTOLIC BLOOD PRESSURE: 76 MMHG | SYSTOLIC BLOOD PRESSURE: 130 MMHG | HEIGHT: 73 IN | WEIGHT: 180 LBS | RESPIRATION RATE: 15 BRPM | HEART RATE: 75 BPM | BODY MASS INDEX: 23.86 KG/M2 | OXYGEN SATURATION: 96 % | TEMPERATURE: 98.5 F

## 2022-01-20 LAB — POCT - HEMOGLOBIN (HGB), QUANTITATIVE, TRANSCUTANEOUS: 14.7

## 2022-01-20 PROCEDURE — 94729 DIFFUSING CAPACITY: CPT

## 2022-01-20 PROCEDURE — 94060 EVALUATION OF WHEEZING: CPT

## 2022-01-20 PROCEDURE — 88738 HGB QUANT TRANSCUTANEOUS: CPT | Mod: 59

## 2022-01-20 PROCEDURE — 87811 SARS-COV-2 COVID19 W/OPTIC: CPT

## 2022-01-20 PROCEDURE — 94726 PLETHYSMOGRAPHY LUNG VOLUMES: CPT

## 2022-03-02 ENCOUNTER — APPOINTMENT (OUTPATIENT)
Dept: PULMONOLOGY | Facility: CLINIC | Age: 72
End: 2022-03-02
Payer: MEDICARE

## 2022-03-02 VITALS
OXYGEN SATURATION: 94 % | TEMPERATURE: 97.8 F | HEART RATE: 112 BPM | DIASTOLIC BLOOD PRESSURE: 94 MMHG | SYSTOLIC BLOOD PRESSURE: 149 MMHG

## 2022-03-02 PROCEDURE — 99214 OFFICE O/P EST MOD 30 MIN: CPT

## 2022-03-02 PROCEDURE — 99406 BEHAV CHNG SMOKING 3-10 MIN: CPT

## 2022-03-03 NOTE — DISCUSSION/SUMMARY
[FreeTextEntry1] : COPD progressive,increase in SOB\par Current every day smoker.\par pulmonary nodules\par mild bronchiectasis \par

## 2022-03-03 NOTE — ASSESSMENT
[FreeTextEntry1] : Discontinue smoking.  Urged.  Patient with progressive COPD.\par \par add spiriva to Symbicort or change to Breztri\par if no improvement will go back on prednisone 10 mg daily\par Aware of long term side effects of prednisone. \par ct chest 1 year from last 8/5/21\par \par 35 minutes spent in evaluation review of studies and management.

## 2022-03-03 NOTE — HISTORY OF PRESENT ILLNESS
[TextBox_4] : \par Feels breathing has gotten worse\par \par heart checked from Dr Patino, had stress test\par Smoking. same no decrease\par using Symbicort only\par not taking prednisone 2 months ago stopped\par no cough or mucus, slight wheeze\par \par Had ST. \par \par \par \par

## 2022-03-03 NOTE — PROCEDURE
[FreeTextEntry1] : PFT 1/20/22 reviewed and discussed\par \par Ct chest August 2021 reviewed\par \par \par \par \par

## 2022-03-03 NOTE — PHYSICAL EXAM
[No Acute Distress] : no acute distress [Normal Oropharynx] : normal oropharynx [Supple] : supple [Normal Appearance] : normal appearance [Thyroid Not Enlarged] : thyroid not enlarged [No Neck Mass] : no neck mass [No JVD] : no jvd [Normal S1, S2] : normal s1, s2 [No Murmurs] : no murmurs [Normal to Percussion] : normal to percussion [No Abnormalities] : no abnormalities [Benign] : benign [No Clubbing] : no clubbing [No Cyanosis] : no cyanosis [No Edema] : no edema [TextBox_68] : Prolongation of expiration with occ rhonchi and wheeze.

## 2022-08-01 ENCOUNTER — TRANSCRIPTION ENCOUNTER (OUTPATIENT)
Age: 72
End: 2022-08-01

## 2022-08-01 ENCOUNTER — OUTPATIENT (OUTPATIENT)
Dept: OUTPATIENT SERVICES | Facility: HOSPITAL | Age: 72
LOS: 1 days | End: 2022-08-01
Payer: MEDICARE

## 2022-08-01 VITALS
RESPIRATION RATE: 18 BRPM | OXYGEN SATURATION: 99 % | SYSTOLIC BLOOD PRESSURE: 145 MMHG | DIASTOLIC BLOOD PRESSURE: 80 MMHG | HEART RATE: 65 BPM

## 2022-08-01 VITALS
OXYGEN SATURATION: 98 % | RESPIRATION RATE: 16 BRPM | TEMPERATURE: 98 F | SYSTOLIC BLOOD PRESSURE: 156 MMHG | DIASTOLIC BLOOD PRESSURE: 83 MMHG | HEART RATE: 107 BPM

## 2022-08-01 DIAGNOSIS — R06.02 SHORTNESS OF BREATH: ICD-10-CM

## 2022-08-01 LAB
ALBUMIN SERPL ELPH-MCNC: 4.3 G/DL — SIGNIFICANT CHANGE UP (ref 3.3–5)
ALP SERPL-CCNC: 38 U/L — LOW (ref 40–120)
ALT FLD-CCNC: 22 U/L — SIGNIFICANT CHANGE UP (ref 10–45)
ANION GAP SERPL CALC-SCNC: 11 MMOL/L — SIGNIFICANT CHANGE UP (ref 5–17)
AST SERPL-CCNC: 20 U/L — SIGNIFICANT CHANGE UP (ref 10–40)
BILIRUB SERPL-MCNC: 0.8 MG/DL — SIGNIFICANT CHANGE UP (ref 0.2–1.2)
BUN SERPL-MCNC: 14 MG/DL — SIGNIFICANT CHANGE UP (ref 7–23)
CALCIUM SERPL-MCNC: 8.8 MG/DL — SIGNIFICANT CHANGE UP (ref 8.4–10.5)
CHLORIDE SERPL-SCNC: 108 MMOL/L — SIGNIFICANT CHANGE UP (ref 96–108)
CO2 SERPL-SCNC: 21 MMOL/L — LOW (ref 22–31)
CREAT SERPL-MCNC: 0.98 MG/DL — SIGNIFICANT CHANGE UP (ref 0.5–1.3)
EGFR: 81 ML/MIN/1.73M2 — SIGNIFICANT CHANGE UP
GLUCOSE SERPL-MCNC: 95 MG/DL — SIGNIFICANT CHANGE UP (ref 70–99)
HCT VFR BLD CALC: 43.4 % — SIGNIFICANT CHANGE UP (ref 39–50)
HGB BLD-MCNC: 15.3 G/DL — SIGNIFICANT CHANGE UP (ref 13–17)
MCHC RBC-ENTMCNC: 34.1 PG — HIGH (ref 27–34)
MCHC RBC-ENTMCNC: 35.3 GM/DL — SIGNIFICANT CHANGE UP (ref 32–36)
MCV RBC AUTO: 96.7 FL — SIGNIFICANT CHANGE UP (ref 80–100)
NRBC # BLD: 0 /100 WBCS — SIGNIFICANT CHANGE UP (ref 0–0)
PLATELET # BLD AUTO: 253 K/UL — SIGNIFICANT CHANGE UP (ref 150–400)
POTASSIUM SERPL-MCNC: 4.4 MMOL/L — SIGNIFICANT CHANGE UP (ref 3.5–5.3)
POTASSIUM SERPL-SCNC: 4.4 MMOL/L — SIGNIFICANT CHANGE UP (ref 3.5–5.3)
PROT SERPL-MCNC: 7.4 G/DL — SIGNIFICANT CHANGE UP (ref 6–8.3)
RBC # BLD: 4.49 M/UL — SIGNIFICANT CHANGE UP (ref 4.2–5.8)
RBC # FLD: 13.9 % — SIGNIFICANT CHANGE UP (ref 10.3–14.5)
SODIUM SERPL-SCNC: 140 MMOL/L — SIGNIFICANT CHANGE UP (ref 135–145)
WBC # BLD: 5.46 K/UL — SIGNIFICANT CHANGE UP (ref 3.8–10.5)
WBC # FLD AUTO: 5.46 K/UL — SIGNIFICANT CHANGE UP (ref 3.8–10.5)

## 2022-08-01 PROCEDURE — 93454 CORONARY ARTERY ANGIO S&I: CPT

## 2022-08-01 PROCEDURE — 93010 ELECTROCARDIOGRAM REPORT: CPT

## 2022-08-01 PROCEDURE — C1769: CPT

## 2022-08-01 PROCEDURE — 85027 COMPLETE CBC AUTOMATED: CPT

## 2022-08-01 PROCEDURE — 93005 ELECTROCARDIOGRAM TRACING: CPT

## 2022-08-01 PROCEDURE — 36415 COLL VENOUS BLD VENIPUNCTURE: CPT

## 2022-08-01 PROCEDURE — 93454 CORONARY ARTERY ANGIO S&I: CPT | Mod: 26

## 2022-08-01 PROCEDURE — C1887: CPT

## 2022-08-01 PROCEDURE — 80053 COMPREHEN METABOLIC PANEL: CPT

## 2022-08-01 PROCEDURE — C1894: CPT

## 2022-08-01 NOTE — ASU PATIENT PROFILE, ADULT - NS PRO MODE OF ARRIVAL
September 24, 2020       Shelton Chris MD  [address]  [facility] [address]  Via In Basket      Patient: Mark Anthony Urrutia   YOB: 1956   Date of Visit: 9/22/2020       Dear Dr. Chris:    I saw your patient, Nael Urrutia, for an evaluation. Below are my notes for this visit with him.    If you have questions, please do not hesitate to call me.          Sincerely,        Clyde Lubin MD        CC: No Recipients  Clyde Lubin MD  9/24/2020 12:05 PM  Sign when Signing Visit    [facility]  SURGERY HISTORY AND PHYSICAL    CHIEF COMPLAINT:  Consultation (Subcutaneous nodule of right upper extremity)       HISTORY OF PRESENT ILLNESS:    Mark Anthony Urrutia is a 64 year old male seen today for right axillary hidradenitis. It has been present for years and has required a fair amount of treatment. He has one sinus and area that appears to be most symptomatic at the present time. This likely has been chronically infected. His use of crutches continually irritates this area leading to bleeding and drainage from the friction.       HISTORIES:  ALLERGIES:   Allergen Reactions   • Augmentin [Amoxicillin-Pot Clavulanate] CARDIAC DISTURBANCES and SHORTNESS OF BREATH     Current Outpatient Medications   Medication Sig Dispense Refill   • benzoyl peroxide (BPO-5 Wash) 5 % external liquid Apply to affected areas daily. Leave on for 1 minute. Rinse off completely. May bleach fabrics. 226 g 11   • doxycycline hyclate (VIBRA-TABS) 100 MG tablet Take one by mouth twice a day in the middle of a meal. 60 tablet 3   • lisinopril-hydroCHLOROthiazide (ZESTORETIC) 10-12.5 MG per tablet Take 1 tablet by mouth daily. 90 tablet 1   • IBUPROFEN PO Take by mouth as needed.     • gabapentin (NEURONTIN) 600 MG tablet Take 2 tablets by mouth at bedtime. 60 tablet 11   • acetaminophen (TYLENOL) 500 MG tablet Take 500 mg by mouth every 6 hours as needed for Pain.     • MELATONIN PO Indications: Take two tablets at  night     • Cholecalciferol (VITAMIN D3) 5000 units capsule Take 2 capsules by mouth daily.     • Ascorbic Acid (VITAMIN C) 500 MG tablet Take 2,000 mg by mouth daily.      • Multiple Vitamin (MULTI VITAMIN DAILY) Tab Take 1 tablet by mouth daily.     • aspirin 81 MG tablet Take 81 mg by mouth daily.     • oxyCODONE-acetaminophen (PERCOCET) 5-325 MG per tablet Take 1 tablet by mouth every 6 hours as needed for Pain (Not to exceed 4000 mg acetaminophen per day). Indication:  Kidney stones 15 tablet 0     No current facility-administered medications for this visit.      Past Medical History:   Diagnosis Date   • Arthritis    • Blood clot associated with vein wall inflammation     left leg    • Chronic pain     hip and knee   • Colon cancer (CMS/HCC)    • Fracture     left wrist   • Gastroesophageal reflux disease    • High cholesterol    • Kidney stone    • Malignant neoplasm (CMS/HCC)     skin face, basal cell removed   • Pneumonia    • Pulmonary hypertension (CMS/HCC)    • Sinusitis, chronic    • Sleep apnea     bi pap Full mask   • Tenosynovitis of ankle     left     Past Surgical History:   Procedure Laterality Date   • Appendectomy  1/23/2016    converted to open ileocecectomy   • Back surgery     • Cardiac catherization  04/18/2008    normal coronaries, mod pulm HTN, normal valves.    • Colonoscopy  5/2016    repeat 1 year per Dr. Foote for hx: colon cancer   • Colonoscopy  05/22/2017    [facility], repeat in three years (2020)   • Colonoscopy  12/10/2019   • Egd  10/01/2019   • Ir implantable port vein access  2016    left chest    • Knee scope,shave articular cart Left    • Right colectomy Right 01/23/2016    Ileocecectomy for colon cancer.    • Rotator cuff repair Right 2010   • Skin biopsy      basal cell of face   • Wrist surgery Left      accident- severed tendon and artery in wrist     Social History     Tobacco Use   Smoking Status Former Smoker   • Packs/day: 0.00   • Types: Cigarettes   • Quit  date: 1993   • Years since quittin.7   Smokeless Tobacco Never Used     Social History     Substance and Sexual Activity   Alcohol Use No   • Frequency: Never   • Drinks per session: 1 or 2   • Binge frequency: Never    Comment: quit drinking        REVIEW OF SYSTEMS:    Constitutional:  Patient denies fever, chills, tiredness or malaise.    Eyes:  Denies change in visual acuity, pain, burning or itching.    Immunologic:  Denies hives, seasonal allergies.    HENT:  Denies sinus problems, ear infections, nasal congestion or sore throat.    Respiratory:  Denies cough, shortness of breath.    Cardiovascular:  Denies chest pain, edema.    Gastrointestinal:  Denies abdominal pain, nausea, vomiting, bloody stools or diarrhea.    Genitourinary:  Denies urine retention, painful urination, urinary frequency, blood in urine or nocturia.    Musculoskeletal:  Denies back pain, neck pain, joint pain or leg swelling.    Integument:  Denies rash, itching.    Neurologic:  Denies headache, focal weakness or sensory changes.    Endocrine:  Denies polyuria, polydipsia or temperature intolerance.    Lymphatic:  Denies swollen glands, weight loss.    Psychiatric:  Denies anxiety, depression, hallucinations, irritability or sleeping problems.    PHYSICAL EXAM:    Vital Signs:    Visit Vitals  /72 (BP Location: LUE - Left upper extremity, Patient Position: Sitting, Cuff Size: Large Adult)   Pulse 76   Temp 96.5 °F (35.8 °C) (Temporal)   Resp 20   Ht 5' 11\" (1.803 m)   Wt (!) 163.2 kg Comment: 359.8lbs   BMI 50.18 kg/m²     Constitutional:  The patient is alert, oriented and cooperative.   Integument:  Warm. Dry. No erythema. No rash.    HENT:  Normocephalic. Atraumatic. Bilateral external ears normal.   Neck: Normal range of motion. No tenderness. Supple. No stridor.    Cardiovascular:  Normal heart rate. Normal rhythm. No murmurs. No rubs. No gallops.    Respiratory:  Normal breath sounds. No respiratory distress. No  wheezing. No chest tenderness.  Right axilla with excessive sinuses, chronic erythema, and scar. Area that is problematic in within central portion of the axilla.    LABORATORY DATA:    Lab Results   Component Value Date    SODIUM 139 06/24/2020    POTASSIUM 4.1 06/24/2020    CHLORIDE 109 (H) 06/24/2020    CO2 24 06/24/2020    BUN 25 (H) 06/24/2020    CREATININE 0.92 06/24/2020    GLUCOSE 92 06/24/2020    WBC 10.0 06/09/2020    HCT 39.7 06/09/2020    HGB 12.7 (L) 06/09/2020     06/09/2020    AST 16 06/24/2020    GPT 27 06/24/2020    ALKPT 98 06/24/2020    BILIRUBIN 0.4 06/24/2020      INR (no units)   Date Value   09/13/2019 0.9     TSH (mcUnits/mL)   Date Value   12/18/2017 2.632      Lab Results   Component Value Date    COL Yellow 06/09/2020    UAPP Hazy 06/09/2020    USPG 1.019 06/09/2020    UPH 5.0 06/09/2020    UPROT 100  (A) 06/09/2020    UGLU Negative 06/09/2020    UKET Negative 06/09/2020    UBILI Negative 06/09/2020    URBC Large (A) 06/09/2020    UNITR Negative 06/09/2020    UROB 0.2 06/09/2020    UWBC Negative 06/09/2020        IMAGING STUDIES:     none    ASSESSMENT:    1. Subcutaneous nodule of right upper extremity         There are no contraindications to the proposed anesthesia.     PLAN:    No follow-ups on file.    The patient has fairly severe hidradenitis with an area of chronic drainage. It would be reasonable to excise this area and it will be fairly extensive based on his body habitus and the exam. I imagine we will need to excise 8-10 cm of skin down deep within the axilla. A multi layer closure will be required. I would anticipate this would be best conducted with general anesthesia. The risks and benefits were discussed including the high risk of wound infection and nonhealing due to his medical comorbidities and body habitus.    The patient indicated understanding of the diagnosis and agreed with the plan of care.              ambulatory

## 2022-08-01 NOTE — ASU PATIENT PROFILE, ADULT - VISION (WITH CORRECTIVE LENSES IF THE PATIENT USUALLY WEARS THEM):
How Severe Is Your Acne?: moderate
Normal vision: sees adequately in most situations; can see medication labels, newsprint
Is This A New Presentation, Or A Follow-Up?: Follow Up Acne

## 2022-08-01 NOTE — H&P CARDIOLOGY - NSICDXFAMILYHX_GEN_ALL_CORE_FT
FAMILY HISTORY:  Father  Still living? No  FH: congestive heart failure, Age at diagnosis: Age Unknown

## 2022-08-01 NOTE — ASU DISCHARGE PLAN (ADULT/PEDIATRIC) - ASU DC SPECIAL INSTRUCTIONSFT
Wound Care:   the day AFTER your procedure remove bandage GENTLY, and clean using  mild soap and gentle warm, water stream, pat dry. leave OPEN to air. YOU MAY SHOWER   DO NOT apply lotions, creams, ointments, powder, perfumes to your incision site  DO NOT SOAK your site for 1 week (no baths, no pools, no tubs, etc...)  Check  your groin and /or wrist daily. A small amount of bruising, and soreness are normal    ACTIVITY: for 24 hours   - DO NOT DRIVE  - DO NOT make any important decisions or sign legal documents   - DO NOT operate heavy machineries  - you may resume sexual activity in 48 hours, unless otherwise instructed by your cardiologist     If your procedure was done through the WRIST: for the NEXT 3 DAYS:  - avoid pushing, pulling, with that affected wrist   - avoid repeated movement of that hand and wrist (eg: typing, hammering)  - DO NOT LIFT anything more than 5 lbs     MEDICATION:   take your medications as explained (see discharge paperwork)   If you received a STENT, you will be taking antiplatelet medications to KEEP YOUR STENT OPEN ( eg: Aspirin, Plavix, Brilinta, Effient, etc).  Take as prescribed DO NOT STOP taking them without consulting with your cardiologist first.     Follow heart healthy diet reccomended by your doctor, , if you smoke STOP SMOKING ( may call [phone] for center of tobacco control if you need assistance)     CALL your doctor to make appointment in 2 WEEKS     ***CALL YOUR DOCTOR***  if you experience: fever, chills, body aches, or severe pain, swelling, redness, heat or yellow discharge at incision site  If you experience Bleeding or excruciating pain at the procedural site, swelling ( golf ball size) at your procedural site  If you experience CHEST PAIN  If you experience extremity numbness, tingling, temperature change ( of your procedural site)   If you are unable to reach your doctor, you may contact:   -Cardiology Office at [facility] at [phone] or   - [facility] [phone]  - [facility] [phone]

## 2022-08-01 NOTE — ASU PATIENT PROFILE, ADULT - FALL HARM RISK - TYPE OF ASSESSMENT
Subjective     Julio Gonzalez is a 73 y.o. male who presents with Medication Management            HPI   Patient is here with his wife, follow-up Percocet refill currently on Percocet 10 mg a day quantity 4/day or 120 tablets/month for chronic knee, hip, and back pain.  Had been seeing Dr. Mathias from pain management and I have assumed his pain medication refills.  Pain is stable and controlled on the Percocet, baseline pain level 8/10 without the medication pain would be 10 out of 10, medication allows him to perform his ADLs on a regular basis without side effects of drowsiness, sedation, memory loss, depression, constipation.  Tries to keep active in the house, does not exercise on a regular basis.  Tries to limit sweets, candies in his diet.  Blood pressure runs 130s when he does check it at home, remains on losartan, Dyazide, amlodipine, potassium supplementation 20 mill equivalents 2 tablets twice daily and 1 tablet at noon.  Most recent potassium 3.4.  Paroxysmal atrial fibrillation followed by cardiology, no palpitations, chest pain or shortness of breath with activity.  Chronic insomnia stable on Ambien, patient usually goes to sleep around 1 in the morning, gets up at 5:00, and well sometimes take a nap in the morning as well.  Ambien does not cause any depression, memory loss, perhaps a bit of drowsiness when he first wakes up but he does not go out or do anything active that would cause him to fall.  Medications, allergies, medical history, surgical history, social history, family history  reviewed and updated        Current Outpatient Medications   Medication Sig Dispense Refill   • simvastatin (ZOCOR) 20 MG Tab TAKE ONE TABLET BY MOUTH EVERY EVENING 100 Tablet 2   • oxyCODONE-acetaminophen (PERCOCET-10)  MG Tab Take 1 Tablet by mouth every 6 hours as needed for Severe Pain (back pain) for up to 30 days. 120 Tablet 0   • zolpidem (AMBIEN) 10 MG Tab TAKE ONE TABLET BY MOUTH AT BEDTIME AS  NEEDED FOR SLEEP FOR UP TO 90 DAYS. 90 Tablet 1   • triamterene-hctz (MAXZIDE-25/DYAZIDE) 37.5-25 MG Tab Take 2 Tablets by mouth every day. 180 Tablet 3   • naproxen (NAPROSYN) 500 MG Tab TAKE ONE TABLET BY MOUTH TWICE A DAY AS NEEDED FOR PAIN -GENERIC FOR NAPROSYN 180 Tablet 3   • DULoxetine (CYMBALTA) 60 MG Cap DR Particles delayed-release capsule TAKE ONE CAPSULE BY MOUTH DAILY 90 Capsule 3   • potassium chloride SA (KLOR-CON M20) 20 MEQ Tab CR Take 2 po qam, 1 po at noon, 2 po qpm 450 Tablet 2   • amLODIPine (NORVASC) 10 MG Tab TAKE ONE TABLET BY MOUTH DAILY 100 Tablet 3   • losartan (COZAAR) 100 MG Tab TAKE ONE TABLET BY MOUTH DAILY 100 Tablet 3   • loratadine (CLARITIN) 10 MG Tab      • Multiple Vitamins-Minerals (MULTI COMPLETE PO) Take  by mouth.     • aspirin (ASA) 81 MG Chew Tab chewable tablet Take 1 Tab by mouth every day. 100 Tab 11   • polyethylene glycol/lytes (MIRALAX) PACK Take 17 g by mouth every day.     • docusate sodium (COLACE) 100 MG CAPS Take 100 mg by mouth every day.     • vitamin D (CHOLECALCIFEROL) 1000 UNIT TABS Take 6,000 Units by mouth every day.       No current facility-administered medications for this visit.                 Status post lumbar surgery  2/01 MRI lumbar spine DJD L5-S1 anterolisthesis 4-5 mm marked stenosis saw  neurosurgery  9/09  neurosurgery note  12/09 neurosurgery note, severe left-sided foraminal stenosis, L5-S1 spondylolisthesis 4-5 mm recommend surgery, patient declined, has had epidural with no benefit,tried lyrica and neurontin before, declines cymbalta trial  4/10 note dr. johnson neurosurgery who gives patient norco, he is retiring, and has offered patient surgical therapy versus continuing norco 10 mg which we'll assume dispensing.  10/11 MRI lumbar spine grade 1 spondylolisthesis 9mm, bilateral spondylosis L5, moderate bilateral L5 stenosis, 2.4 cm left kidney cyst  10/11 xray LS, grade 1 spondylolisthesis L5 on S1 increased from prior  exam, 1.3 cm, no significant change in flexion  11/11  pain note right L5-S1 transforaminal epidural  1/16/12 start cymbalta trial (3/12 stop cymbalta no benefit)  1/12 dr. murphy neurosurgery note surgical intervention offered L4 to sacrum decompression  3/26/12 restarted cymbalta    9/26/12  neurosurgery operative note decompression at L5-S1and bilateral foraminotomies,transforaminal lumbar interbody fusion, L4-L5 and L5-S1, with expandable cage 8-12 mm.  2/20/13 dr. murphy neurosurgery note, lumbar films, EMG NCS lower ext inconclusive, repeat MRI lumbar spine pending  3/13/13 MRI lumbar spine postoperative changes L4-S1 lumbar laminectomy, interbody fusion, disc space insert L4-L5 L5-S1, posterior spinal fusion and fixation, L5-S1 moderate right foraminal stenosis. 2.4 cm mid right renal cyst unchanged  3/26/13 dr. murphy neurosurgery note, no further surgical intervention necessary, chronic narcotics norco 10 mg 5 per day; I will assume the norco rx from   1/25/15 off naprosyn and cymbalta; on norco 10 mg one every four hours #150  5/21/15 on percocet 10 mg five per day and on cymbalta 60 mg  3/25/16 start lyrica 50 mg tid for fibromyalgia, continue percocet 10 mg 5 per day and cymbalta 60 mg  4/12/16 increase lyrica to 100 mg tid (50 mg two tid), continue cymbalta 60 mg and percocet 10 mg 5 per day  9/26/16 off lyrica, on percocet 10 mg qid per  pain management and cymbalta  7/9/19 on percocet 10 mg #140 per 28 days from  pain management also on cymbalta 60 mg and naprosyn 500 mg bid  10/7/20  pain note on oxycodone #140 per 28 days will decrease to #112 per 28 days  11/4/20  pain note on oxycodone #112 per 28 days   10/7/21 I am taking over percocet refill, 4 per day #120 per 30 days  10/7/21   12/13/21 percocet 10 mg #120     Speech language deficit  1999 affected right arm, no old records  Some diff with short recall, and occasional diff  with expressing self when fatigued     Status post knee replacement left 2001     Status post hip replacement bilateral  12/03 right total hip replacement  1/04 left total hip replacement      Preventative health  4/6/07 colonoscopy [facility] negative  10/26/10 zoster vaccine  [facility]  12/12/19 tdap  12/12/19 pneumovax  8/2/21 declines colon  8/13/21 FIT negative  8/24/21 psa 2.2  8/24/21 hep c ab negative  10/7/21 shingrix second  10/14/21 covid pfizer booster  6/22/22 vit d 85     Paroxysmal atrial fibrillation  Sees [facility]  2005 s/p maze procedure with a stroke related to that as a complication, have no records at all regarding that, no CT scan or MRI scan and probably had a cardiac catheterization by renal physicians in 2005 that was negative for coronary artery disease  8/11 [facility] note EKG NSR  5/12 [facility] note, on asa daily  11/12 [facility] note  4/29/13 cardiology note  11/3/13 cardiology note on asa  9/22/14 cardiology note sinus, follow up 6 months  5/12/15  cardiology note, paroxysmal atrial fibrillation status post maze operation, sinus rhythm  11/18/15 cardiology note stable follow one year  4/4/17  cardiology note, paroxysmal atrial fibrillation no recurrence, follow-up one year  2/5/19 cardiology note paroxysmal atrial fibrillation, sinus on exam, no recurrence of atrial fibrillation status post maze, follow-up 1 year  6/16/20 cardiology note atrial fibrillation status post maze no recurrence, hypertension stable on current regimen, work on weight loss, follow-up 6 months  7/13/21  cardiology note  1/4/22 declines overnight pulse oximetry     neck pain  3/06 MRI cervical spine moderate subdural frontal stenosis C5-C6 lesser extent C6-C7     knee arthritis   9/3/14 x-ray right knee marked tricompartmental osteoarthritis, most severe medial compartment  2/19/16 has seen  orthopedics, declines knee replacement       Insomnia  2009 on ambien 10 mg  2/19/16 unable to  taper ambien, referral to behavioral sleep psychology recommended he declines  9/26/16 on ambien 10 mg work on taper, declines referral to sleep psychology  7/9/18 on ambien declines sleep management referral     Impaired glucose metabolism  11/8/18 A1c 6%  12/6/19 A1c 5.8%  8/24/21 A1c 5.5%      Hypertension  7/10 [facility] note change from hyzaar to enalapril hct 10/25  1/11 [facility] note bp not controlled on enalapril hct 10/25, change back to losartan hct 100/25 and norvasc 10 mg  6/12 K+ 3.0, increase kdur to 20 tid, consider decreasing hctz if possible to decrease K+ supplementation  12/12 cozaar 100 mg, dyazide 37.5/25 mg, kcl 20 meq, norvasc 10 mg  1/29/14 on cozaar 100 mg, dyazide 37.5/25 mg, norvasc 10 mg, klor 20 meq bid; will increase to klor 20 meq tid  9/3//14 K+ 3.1 on klor 20 meq tid, increase to 20 meq two tab bid  9/3/14 urine mac <0.5 on cozaar 100 mg, dyazide 37.5/25 mg, norvasc 10 mg, klor 20 meq two tab bid  6/30/15 Na 132,K+ 3.5 on cozaar 100 mg, dyazide 37.5/25 mg, norvasc 10 mg, klor 20 meq two tab bid  8/31/16 K+ 3.3 on cozaar 100 mg, dyazide 37.5/25 mg, norvasc 10 mg, klor 20 meq two tab qday will increase to 2 bid  7/11/17 K+3.3 on cozaar 100 mg, dyazide 37.5/25 mg, norvasc 10 mg, klor 20 meq two bid  2/5/19 cardiology note paroxysmal atrial fibrillation, sinus on exam, no recurrence of atrial fibrillation status post maze, follow-up 1 year  12/6/19 K+3.5 on cozaar 100 mg, dyazide 37.5/25 mg, norvasc 10 mg, klor 20 meq two bid  6/16/20 cardiology note atrial fibrillation status post maze no recurrence, hypertension stable on current regimen, work on weight loss, follow-up 6 months  7/13/21  cardiology note on cozaar 100 mg, dyazide 37.5/25 mg, norvasc 10 mg, klor 20 meq two bid  8/24/21 K+ 3.4 on klor 20 meq two bid recommend increase to 2 am, 1 noon, 2 pm  6/22/22 K+ 3.4 on klor 20 meq 2 am, 1 noon, 2 pm on cozaar 100 mg, dyazide 37.5/25 mg, norvasc 10 mg      History stroke  1999 affected  right arm, no old records  Some difficulty with short recall, and occasional diff with expressing self when fatigued     History squamous cell carcinoma  Saw  dermatology offered aldara he declined  3/27/17  dermatology note  5/1/17  dermatology 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, here for mohs  9/19/17  dermatology note continue      Fibromyalgia  3/25/16 start lyrica 50 mg tid for fibromyalgia, continue percocet 10 mg 5 per day and cymbalta 60 mg  4/12/16 increase lyrica to 100 mg tid (50 mg two tid), continue cymbalta 60 mg and percocet 10 mg 5 per day  4/28/16 increase lyrica to 150 mg tid, continue cymbalta 60 mg and percocet 10 mg five times per day  5/10/16 on lyrica 50 mg three tabs tid, change to 100 mg am, 150 mg noon, 100 mg pm     Dyslipidemia  Followed by cardiology  2/09 cholesterol 135, triglycerides 100, HDL 47, LDL 68  8/09 chol 138,trig 101,hdl 49,ldl 69  3/10 chol 123,trig 66,hdl 49,ldl 61  7/10 [facility] note change vytorin to zocor 40  11/10 chol 141,trig 75,hdl 62,ldl 64 on vytorin 10/20 mg per [facility]  10/11 chol 159,trig 102,hdl 52,ldl 87 on vytorin 10/20 per [facility]  2/29/12 stop vytorin, change to zocor 20 mg per [facility]  6/12 chol 152,trig 102,hdl 49,ldl 83 on zocor 20 mg  6/13 chol 150,trig 130,hdl 52,ldl 72 on zocor 20 mg  1/29/14 chol 147,trig 127,hdl 50,ldl 72 on zocor 20 mg  9/3/14 chol 169,trig 228,hdl 49,ldl 74 on zocor 20 mg  6/30/15 chol 151,trig 88,hdl 63,ldl 70 on zocor 20 mg  8/31/16 chol 169,trig 130,hdl 59,ldl 84 on zocor 20 mg  7/11/17 chol 147,trig 146,hdl 51,ldl 67 on zocor 20 mg  11/8/18 chol 189,trig 215,hdl 54,ldl 92 on zocor 20 mg  12/6/19 chol 163,trig 214,hdl 54,ldl 66 on zocor 20 mg  8/24/21 chol 166,trig 142,hdl 57,ldl 81 on zocor 20 mg  6/22/22 chol 149,trig 139,hdl 48,ldl 73 on zocor 20 mg     Depression  3/12 tried cymalta for pain no benefit  12/6/13 PHQ 9 score 15, start cymbalta samples 30 mg x 7 days, then  60 mg  12/20/13 cymbalta 60 mg  1/22/15 off cymbalta    5/21/15 on cymbalta 60 mg  2/19/16 referral to behavioral health recommended he declines  11/29/16 depression screening score 0, continues on cymbalta  1/30/18 depression screening score 0 on cymbalta  8/2/21 on cymbalta screening 0 score   1/4/22 on cymbalta screening 0/3 score     Chronic opiate  3/26/13 dr. murphy neurosurgery note, no further surgical intervention necessary, chronic narcotics norco 10 mg 5 per day; I will assume the norco rx from   12/6/13 change from hydrocodone 10 mg 4-5 tablets per day to oxycodone 10 mg 4-5 tabs per day  1/22/15 UDT millenium consistent  2/24/15 change from hydrocodone 10 mg to oxycodone 10 mg (percocet) qid #150 per 30 days  1/22/14 norco 10 mg #180, millenium URT done  2/24/15 change to percocet 10 mg #150 only one prescription provided, if successful for pain relief call us and I will write next 3 refills that he can  and then followup in office after that  5/21/15 percocet 10 mg #150 refill  5/21/15 narcotic contract  5/21/15 opiate risk score 1  8/24/15 refill percocet #150 x 3  8/24/15 referral pain management  10/4/15   2/19/16   2/19/16 increase frequency to percocet 10 mg q 4 hours #180 per month, declined trial of MS contin, because of increasing utilization, referral made to pain management   5/24/16   5/24/16 UDT millennium  5/24/16 refill percocet #150 x 2 refer to  pain management to assume opiate prescription writing   6/22/16  pain management note, initiate pregabalin, work on taper oxycodone  7/6/16  continue oxycodone and pregabalin  8/9/16  pain management note continue oxycodone, consider ultrasound-guided knee injections to right  9/12/16  pain management note, UDT consistent  7/9/19 on percocet 10 mg #140 per 28 days from  pain management also on cymbalta 60 mg and naprosyn 500 mg bid  7/9/19 referral [facility]  "pain   7/20/20 on percocet 10 mg 5 per day per  pain management, cymbalta 60 mg, naprosyn 500 mg bid   4/23/21 6/23/21  pain note on percocet 10 mg one every 4 hours #112 per 28 days  6/23/21  pain note on percocet 10 mg one every 4 hours #112 per 28 days  6/23/21  pain note on percocet 10 mg one every 4 hours #112 per 28 days  10/7/21 I am taking over percocet refill, 4 per day #120 per 30 days  10/7/21   12/13/21 percocet 10 mg #120  1/4/22   1/4/22 controlled substance contract  3/30/22   3/30/22 UDT   Has tried hydrocodone, NSAIDs, pregabalin, steroid injections, physical therapy, cymbalta                   Patient Active Problem List   Diagnosis   • S/P hip replacement   • S/p lumbar surgery   • Neck pain   • Hypertension   • Paroxysmal atrial fibrillation (HCC)   • Dyslipidemia   • History of squamous cell carcinoma   • Preventative health care   • S/P knee replacement   • Obesity   • S/p heel left surgery   • History of stroke   • Insomnia   • Depression, major, 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 as PCP - General  Norman Peterson M.D. as PCP - [facility] Paneled  Jose Martin Campbell M.D. as Consulting Physician (Anesthesiology)  Irineo Skinner M.D. as Consulting Physician (Internal Medicine Clinical Cardiac Electrophysiology)  Pete Rangel O.D. as Consulting Physician (Optometry)  Reji Roberson as [facility]       ROS           Objective     BP (!) 142/86 (BP Location: Right arm, Patient Position: Sitting, BP Cuff Size: Adult)   Pulse (!) 125   Temp 36.7 °C (98.1 °F)   Ht 1.702 m (5' 7\")   Wt 122 kg (268 lb)   SpO2 92%   BMI 41.97 kg/m²      Physical Exam  Vitals and nursing note reviewed.   Constitutional:       Appearance: Normal appearance.   HENT:      Head: " Normocephalic and atraumatic.      Right Ear: External ear normal.      Left Ear: External ear normal.   Eyes:      Conjunctiva/sclera: Conjunctivae normal.   Cardiovascular:      Rate and Rhythm: Normal rate and regular rhythm.      Heart sounds: Normal heart sounds.   Pulmonary:      Effort: Pulmonary effort is normal.      Breath sounds: Normal breath sounds.   Abdominal:      General: There is no distension.   Musculoskeletal:         General: No deformity.   Skin:     General: Skin is warm.      Findings: No bruising.   Neurological:      General: No focal deficit present.      Mental Status: He is alert.   Psychiatric:         Mood and Affect: Mood normal.                             Assessment & Plan        Assessment  #1 chronic low back pain stable on Percocet 10 mg 4 times daily quantity 120 for 30 days, status post lumbar surgery, seen pain management, physical therapy, has had injections, has tried NSAIDs without benefit, stable and controlled on Percocet    #2 chronic opiate therapy Percocet 10 mg 4 times daily quantity 120 for 30 days without drowsiness, sedation, memory loss, depression, constipation related to the chronic opiate therapy, most recent urine drug screen March 30, opiate contract January 4    #3 hypertension, blood pressures have been stable at home on losartan, Dyazide, amlodipine, potassium 3.4 on potassium 20 mEq 2 tabs in the morning, 1 at noon, 2 in the afternoon or evening    #4 dyslipidemia cholesterol 149, triglycerides 139, HDL 48, LDL 73 on Zocor 20 mg    #5 history of depression, stable in remission on Cymbalta    #6 BMI 40.9    #7 paroxysmal atrial fibrillation followed by cardiology sinus on exam    #8 chronic insomnia on zolpidem without daytime side effects, perhaps a bit of drowsiness when he first gets up but he will sit down and not move about or be active until that wears off      Plan  #1 has follow up with  cardiology, continue check blood pressure  regularly    #2 refill percocet, pain medication is effective in diminishing his pain without significant side effects allowing him to perform his ADLs, he has already seen pain management, physical therapy, and currently is stable and controlled on the medication, I believe the long-term benefits outweigh the risks     #3     #4 refill Percocet to pharmacy for the next 3 months    #5 continue check blood pressure regularly    #6 activity as much as possible, continue good nutrition program limiting sweets, candies, this will help blood pressure and weight loss    #7 continue Cymbalta    #8 recommend COVID-vaccine fourth in series at the pharmacy    #9 continue zolpidem, daytime falling precautions     #10 potassium minimally decreased, continue K-Felisha supplementation    #11 follow-up 3 months   Admission

## 2022-08-01 NOTE — ASU DISCHARGE PLAN (ADULT/PEDIATRIC) - NS MD DC FALL RISK RISK
For information on Fall & Injury Prevention, visit: [email or web address] OR  [email or web address] OR  https://www.cdc.gov/steadi/patient.html

## 2022-08-01 NOTE — ASU DISCHARGE PLAN (ADULT/PEDIATRIC) - CARE PROVIDER_API CALL
Roddy Patino J  CARDIOVASCULAR DISEASE  [address] [place] [address]  Phone: [phone]  Fax: [phone]  Established Patient  Follow Up Time: 2 weeks

## 2022-08-01 NOTE — H&P CARDIOLOGY - HISTORY OF PRESENT ILLNESS
72 year old M (current smoker, 60 + years 1 ppd) with PMHx of HTN, HLD, COPD, sciatica, presents for cardiac catheterization. Pt. was seen by his cardiologist Dr. Patino, for progressive SOB and chest tightness. Pt. was then referred for a Pharmacologic PET Myocardial perfusion imaging with gated wall motion study which revealed a fixed perfusion abnormality in the mid inferior and apical inferior segments which are consistent with attenuation artifact or scar due to cardiomyopathy. LV function EF with stress is 48% and at rest = 46%. Today, patient complains of shortness of breath but has albuterol inhaler on hand which has helped with his shortness of breath. Otherwise, pt denies chest pain, chest tightness, palpitations, lightheadedness, nausea, vomiting.    Cardiologist: Dr. Roddy Patino     72 year old M (current smoker, 60 + years 1 ppd) with PMHx of HTN (non compliant with medication), COPD, sciatica, presents for cardiac catheterization. Pt. was seen by his cardiologist Dr. Patino, for progressive SOB and chest tightness. Pt. states when he walks 10 feet, he needs to sit and feels out of breath. Pt. was then referred for a Pharmacologic PET Myocardial perfusion imaging with gated wall motion study which revealed a fixed perfusion abnormality in the mid inferior and apical inferior segments which are consistent with attenuation artifact or scar due to cardiomyopathy. LV function EF with stress is 48% and at rest = 46%. Today, patient complains of shortness of breath but has albuterol inhaler on hand which has helped with his shortness of breath. Otherwise, pt denies chest pain, chest tightness, palpitations, lightheadedness, nausea, vomiting.    Cardiologist: Dr. Roddy Patino

## 2022-08-17 PROBLEM — M54.30 SCIATICA, UNSPECIFIED SIDE: Chronic | Status: ACTIVE | Noted: 2022-08-01

## 2022-08-17 PROBLEM — I10 ESSENTIAL (PRIMARY) HYPERTENSION: Chronic | Status: ACTIVE | Noted: 2022-08-01

## 2022-08-31 ENCOUNTER — APPOINTMENT (OUTPATIENT)
Dept: PULMONOLOGY | Facility: CLINIC | Age: 72
End: 2022-08-31

## 2022-08-31 VITALS
OXYGEN SATURATION: 95 % | WEIGHT: 180 LBS | DIASTOLIC BLOOD PRESSURE: 75 MMHG | BODY MASS INDEX: 23.75 KG/M2 | TEMPERATURE: 97.2 F | HEART RATE: 76 BPM | SYSTOLIC BLOOD PRESSURE: 130 MMHG

## 2022-08-31 PROCEDURE — 99214 OFFICE O/P EST MOD 30 MIN: CPT | Mod: 25

## 2022-08-31 PROCEDURE — ZZZZZ: CPT

## 2022-08-31 PROCEDURE — 94060 EVALUATION OF WHEEZING: CPT

## 2022-08-31 PROCEDURE — 71046 X-RAY EXAM CHEST 2 VIEWS: CPT

## 2022-08-31 PROCEDURE — 94618 PULMONARY STRESS TESTING: CPT

## 2022-08-31 PROCEDURE — 94726 PLETHYSMOGRAPHY LUNG VOLUMES: CPT

## 2022-08-31 PROCEDURE — 94729 DIFFUSING CAPACITY: CPT

## 2022-08-31 NOTE — REASON FOR VISIT
[Follow-Up] : a follow-up visit [Abnormal CXR/ Chest CT] : an abnormal CXR/ chest CT [COPD] : COPD [Bronchiectasis] : bronchiectasis

## 2022-09-01 NOTE — PHYSICAL EXAM
[No Acute Distress] : no acute distress [Normal Oropharynx] : normal oropharynx [Normal Appearance] : normal appearance [Supple] : supple [Thyroid Not Enlarged] : thyroid not enlarged [No Neck Mass] : no neck mass [No JVD] : no jvd [Normal S1, S2] : normal s1, s2 [No Murmurs] : no murmurs [Normal to Percussion] : normal to percussion [No Abnormalities] : no abnormalities [Benign] : benign [No Clubbing] : no clubbing [No Cyanosis] : no cyanosis [No Edema] : no edema [TextBox_68] : Prolongation of expiration with rare rhonchi and wheeze.

## 2022-09-01 NOTE — ASSESSMENT
[FreeTextEntry1] : Discontinue smoking.  Urged.  Patient with progressive COPD.\par ABX then f/u CT 6 weeks.\par Continue present bronchodilator therapy\par Audiology follow-up.\par \par 35 minutes spent in evaluation review of studies and management.

## 2022-09-01 NOTE — DISCUSSION/SUMMARY
[FreeTextEntry1] : New right upper lobe density.  Cannot confidently see on routine chest radiograph.  May be infectious inflammatory in origin however neoplastic process is a consideration.\par COPD \par Current every day smoker.\par pulmonary nodules\par mild bronchiectasis \par Dyspnea on exertion likely multifactorial related to underlying pulmonary and cardiac disease.\par

## 2022-09-01 NOTE — PROCEDURE
[FreeTextEntry1] : 08/31/2022\par Pulmonary function testing\par These data demonstrate a moderate obstructive ventilatory deficit. There is no significant bronchodilator response. There is evidence of mild hyperinflation. Airway resistance is mildly increased. There is a moderate diffusion impairment. \par PFT attached relatively stable function.\par \par 6-minute walk attached.  Walked 1000 feet.  No desaturation.  Positive dyspnea.\par \par CAT scan noted and reviewed.  Discussed with patient.\par New parenchymal density right upper lobe.\par \par \par \par

## 2022-09-01 NOTE — HISTORY OF PRESENT ILLNESS
[TextBox_4] : Had CAT scan.\par Feels breathing has gotten worse\par Told of some cardiac dysfunction. Decline in EF approx 15 percent by history.\par Seeing Dr. Patino\par Smoking. same no decrease\par using Symbicort only\par Not ill\par No cough. Occ wheeze.No mucous. \par Feels more SOB. \par \par \par \par \par

## 2022-10-13 ENCOUNTER — RX RENEWAL (OUTPATIENT)
Age: 72
End: 2022-10-13

## 2022-10-19 ENCOUNTER — APPOINTMENT (OUTPATIENT)
Dept: PULMONOLOGY | Facility: CLINIC | Age: 72
End: 2022-10-19

## 2022-10-19 VITALS
SYSTOLIC BLOOD PRESSURE: 144 MMHG | DIASTOLIC BLOOD PRESSURE: 83 MMHG | WEIGHT: 180 LBS | HEIGHT: 73 IN | BODY MASS INDEX: 23.86 KG/M2 | OXYGEN SATURATION: 96 % | TEMPERATURE: 98.1 F | HEART RATE: 69 BPM

## 2022-10-19 DIAGNOSIS — I42.9 CARDIOMYOPATHY, UNSPECIFIED: ICD-10-CM

## 2022-10-19 PROCEDURE — 99214 OFFICE O/P EST MOD 30 MIN: CPT

## 2022-10-20 PROBLEM — I42.9 CARDIOMYOPATHY: Status: ACTIVE | Noted: 2022-10-20

## 2022-10-20 NOTE — ASSESSMENT
[FreeTextEntry1] : Discontinue smoking.  Urged.  Patient with progressive COPD.\par In light of significant cardiopulmonary disease as well as absence of significant abnormality in right upper lobe in May 2022 we will continue observation.\par Repeat CT 3-4 months\par Continue present bronchodilator therapy\par Cardiology follow-up.\par \par 35 minutes spent in evaluation review of studies and management.

## 2022-10-20 NOTE — HISTORY OF PRESENT ILLNESS
[TextBox_4] : Had repeat CAT scan.\par Pers. BARRERA but without change. \par Told of some cardiac dysfunction. Decline in EF approx 15 percent by history.\par Seeing Dr. Patino\par Smoking. same no decrease\par using Symbicort only\par Not ill\par No cough. Occ wheeze. No mucous. \par Got COVID Booster.\par \par \par \par \par

## 2022-10-20 NOTE — PROCEDURE
[FreeTextEntry1] : Repeat CT of the chest October 6, 2022 noted and reviewed.\par No definitive change in right upper lobe lesion.  This is compared to study of August 23, 2022.\par However in comparison with May 12, 2022 there is no significant abnormality noted in this area.\par \par 08/31/2022\par Pulmonary function testing\par These data demonstrate a moderate obstructive ventilatory deficit. There is no significant bronchodilator response. There is evidence of mild hyperinflation. Airway resistance is mildly increased. There is a moderate diffusion impairment. \par PFT attached relatively stable function.\par \par 6-minute walk attached.  Walked 1000 feet.  No desaturation.  Positive dyspnea.\par \par \par \par \par

## 2022-10-20 NOTE — DISCUSSION/SUMMARY
[FreeTextEntry1] : New right upper lobe density.  Cannot confidently see on routine chest radiograph.  May be infectious inflammatory in origin however neoplastic process is a consideration. Less likely neoplastic in light of absence of any significant abnormality in this area in May 2022.\par Chronic obstructive pulmonary disease.\par Current every day smoker.\par pulmonary nodules\par mild bronchiectasis \par Dyspnea on exertion likely multifactorial related to underlying pulmonary and cardiac disease.\par

## 2022-11-11 ENCOUNTER — NON-APPOINTMENT (OUTPATIENT)
Age: 72
End: 2022-11-11

## 2022-11-11 ENCOUNTER — APPOINTMENT (OUTPATIENT)
Dept: ELECTROPHYSIOLOGY | Facility: CLINIC | Age: 72
End: 2022-11-11

## 2022-11-11 VITALS
SYSTOLIC BLOOD PRESSURE: 122 MMHG | WEIGHT: 180 LBS | HEART RATE: 86 BPM | DIASTOLIC BLOOD PRESSURE: 78 MMHG | HEIGHT: 73 IN | TEMPERATURE: 98.1 F | OXYGEN SATURATION: 98 % | BODY MASS INDEX: 23.86 KG/M2

## 2022-11-11 PROCEDURE — 93000 ELECTROCARDIOGRAM COMPLETE: CPT

## 2022-11-11 PROCEDURE — 99203 OFFICE O/P NEW LOW 30 MIN: CPT

## 2022-11-11 NOTE — HISTORY OF PRESENT ILLNESS
[FreeTextEntry1] : Mr. KODAK ARRIETA is a 72 year old with a history of  COPD moderate, 18 months ago rapid increase, with dyspnea with a few steps.  No change on PFTs, however. Holter showed that heart is skipping beats. PET scan showed cardiomyopathy. Coronary angiography showed no significant disease. No chest pain, palpitations, lightheadedness, syncope.  Has DJD of spine. NSVT and 5% PVCs. PET showed 44% EF. PFTs showed moderate pulmonary disease.  Are cardiac arrhythmias accounting for symptoms.

## 2022-11-21 ENCOUNTER — NON-APPOINTMENT (OUTPATIENT)
Age: 72
End: 2022-11-21

## 2022-11-28 ENCOUNTER — NON-APPOINTMENT (OUTPATIENT)
Age: 72
End: 2022-11-28

## 2022-11-29 ENCOUNTER — NON-APPOINTMENT (OUTPATIENT)
Age: 72
End: 2022-11-29

## 2022-11-29 RX ORDER — SACUBITRIL AND VALSARTAN 24; 26 MG/1; MG/1
24-26 TABLET, FILM COATED ORAL TWICE DAILY
Qty: 60 | Refills: 2 | Status: ACTIVE | COMMUNITY
Start: 2022-11-29

## 2022-11-29 RX ORDER — LISINOPRIL 10 MG/1
10 TABLET ORAL
Qty: 90 | Refills: 0 | Status: DISCONTINUED | COMMUNITY
Start: 2021-05-13 | End: 2022-11-29

## 2022-12-09 ENCOUNTER — NON-APPOINTMENT (OUTPATIENT)
Age: 72
End: 2022-12-09

## 2022-12-13 ENCOUNTER — NON-APPOINTMENT (OUTPATIENT)
Age: 72
End: 2022-12-13

## 2023-02-01 ENCOUNTER — APPOINTMENT (OUTPATIENT)
Dept: PULMONOLOGY | Facility: CLINIC | Age: 73
End: 2023-02-01
Payer: MEDICARE

## 2023-02-01 VITALS — SYSTOLIC BLOOD PRESSURE: 130 MMHG | OXYGEN SATURATION: 95 % | HEART RATE: 82 BPM | DIASTOLIC BLOOD PRESSURE: 80 MMHG

## 2023-02-01 DIAGNOSIS — R06.02 SHORTNESS OF BREATH: ICD-10-CM

## 2023-02-01 PROCEDURE — 99214 OFFICE O/P EST MOD 30 MIN: CPT

## 2023-02-01 RX ORDER — AZITHROMYCIN 250 MG/1
250 TABLET, FILM COATED ORAL
Qty: 10 | Refills: 0 | Status: DISCONTINUED | COMMUNITY
Start: 2022-08-31 | End: 2023-02-01

## 2023-02-01 NOTE — PROCEDURE
[FreeTextEntry1] : 1/6/23 ct chest reviewed and discussed with pt. compared to prior CTs.\par Increasing right upper lobe density.\par New 4 x 8 mm nodule left upper lobe.\par \par 08/31/2022\par Pulmonary function testing\par These data demonstrate a moderate obstructive ventilatory deficit. There is no significant bronchodilator response. There is evidence of mild hyperinflation. Airway resistance is mildly increased. There is a moderate diffusion impairment. \par PFT attached relatively stable function.\par \par \par \par \par \par \par

## 2023-02-01 NOTE — DISCUSSION/SUMMARY
[FreeTextEntry1] : New and increasing right upper lobe density.  May be infectious inflammatory in origin with component related to mucoid impaction however neoplastic process will need to be excluded.\par Chronic obstructive pulmonary disease.\par Current every day smoker.\par pulmonary nodules.  1 new nodule.\par mild bronchiectasis \par Dyspnea on exertion likely multifactorial related to underlying pulmonary and cardiac disease.\par

## 2023-02-01 NOTE — ASSESSMENT
[FreeTextEntry1] : Obtain pet imaging.\par 10-day course of Zithromax prior to PET scan.\par Discontinue smoking.  Urged.  Patient with progressive COPD.\par Continue present bronchodilator therapy\par Cardiology follow-up.\par Patient will follow-up post pet imaging for further recommendations and decision regarding biopsy.  Likely this can be done bronchoscopically.\par High risk surgical candidate.\par \par 35 minutes spent in evaluation review of studies and management.

## 2023-02-01 NOTE — HISTORY OF PRESENT ILLNESS
[TextBox_4] : Had repeat CAT scan. \par had Omicron 12/18/22\par using Symbicort\par Persistent dyspnea on exertion without significant change.\par Has cardiomyopathy seeing Dr Patino\par Told of some cardiac dysfunction. Decline in EF approx 15 percent by history.\par Smoking. same no decrease\par Not ill\par No cough. Occ wheeze. No mucous. \par \par \par \par \par \par

## 2023-02-10 ENCOUNTER — APPOINTMENT (OUTPATIENT)
Dept: ELECTROPHYSIOLOGY | Facility: CLINIC | Age: 73
End: 2023-02-10

## 2023-02-13 ENCOUNTER — APPOINTMENT (OUTPATIENT)
Dept: NUCLEAR MEDICINE | Facility: IMAGING CENTER | Age: 73
End: 2023-02-13
Payer: MEDICARE

## 2023-02-13 ENCOUNTER — OUTPATIENT (OUTPATIENT)
Dept: OUTPATIENT SERVICES | Facility: HOSPITAL | Age: 73
LOS: 1 days | End: 2023-02-13
Payer: MEDICARE

## 2023-02-13 DIAGNOSIS — R91.8 OTHER NONSPECIFIC ABNORMAL FINDING OF LUNG FIELD: ICD-10-CM

## 2023-02-13 PROCEDURE — 78815 PET IMAGE W/CT SKULL-THIGH: CPT

## 2023-02-13 PROCEDURE — A9552: CPT

## 2023-02-13 PROCEDURE — 78815 PET IMAGE W/CT SKULL-THIGH: CPT | Mod: 26,PI,MH

## 2023-02-21 ENCOUNTER — APPOINTMENT (OUTPATIENT)
Dept: PULMONOLOGY | Facility: CLINIC | Age: 73
End: 2023-02-21
Payer: MEDICARE

## 2023-02-21 VITALS — HEART RATE: 89 BPM | SYSTOLIC BLOOD PRESSURE: 135 MMHG | DIASTOLIC BLOOD PRESSURE: 80 MMHG | OXYGEN SATURATION: 95 %

## 2023-02-21 LAB — POCT - HEMOGLOBIN (HGB), QUANTITATIVE, TRANSCUTANEOUS: 14.7

## 2023-02-21 PROCEDURE — 94729 DIFFUSING CAPACITY: CPT

## 2023-02-21 PROCEDURE — 94727 GAS DIL/WSHOT DETER LNG VOL: CPT

## 2023-02-21 PROCEDURE — 94010 BREATHING CAPACITY TEST: CPT

## 2023-02-21 PROCEDURE — 99214 OFFICE O/P EST MOD 30 MIN: CPT | Mod: 25

## 2023-02-21 PROCEDURE — 88738 HGB QUANT TRANSCUTANEOUS: CPT

## 2023-02-21 PROCEDURE — ZZZZZ: CPT

## 2023-02-21 NOTE — HISTORY OF PRESENT ILLNESS
[Current] : current [TextBox_4] : Had PET Scan\par Occ eats something and feels food can get stuck in throat.  Passes.\par Otherwise no significant change in status.\par Respiratory status stable.\par \par \par \par \par \par \par \par \par

## 2023-02-21 NOTE — ASSESSMENT
[FreeTextEntry1] : Seeing \par Recc. seeing GI\par Discussed with cardiology.  Ejection fraction 40 to 45%.  Is surgical candidate.\par Options discussed regarding right upper lobe lesion including fiberoptic bronchoscopy, needle biopsy as well as direct surgical intervention.  Have agreed upon Bronchoscopy if interventional deems amenable.\par We will have films reviewed.\par Discontinue smoking.  Urged.  Patient with progressive COPD.\par Continue present bronchodilator therapy\par Cardiology follow-up.\par Patient will follow-up post procedure for further recommendations.\par \par 35 minutes spent in evaluation review of studies and management.

## 2023-02-21 NOTE — DISCUSSION/SUMMARY
[FreeTextEntry1] : New and increasing right upper lobe density.  May be infectious inflammatory in origin with component related to mucoid impaction however neoplastic process will need to be excluded.  Positive uptake on pet imaging.\par Chronic obstructive pulmonary disease.\par Current every day smoker.\par pulmonary nodules.  1 new nodule.\par mild bronchiectasis \par Dyspnea on exertion likely multifactorial related to underlying pulmonary and cardiac disease.\par Uptake in prostate gland on pet imaging followed by urology.\par Uptake in esophagus recommended GI evaluation.\par

## 2023-02-21 NOTE — PROCEDURE
[FreeTextEntry1] : PET scan \par Uptake RUL density\par Esophageal and prostate uptake\par \par 02/21/2023\par Pulmonary function testing\par These data demonstrate a moderate obstructive ventilatory deficit. There is elevation in the RV/TLC ratio indicative of possible air trapping. There is a moderate diffusion impairment. \par Compared to prior study of August 2022 there is a decrease in FVC and only mild decrease in FEV1.  Diffusion capacity is stable.\par \par

## 2023-02-22 LAB
ALBUMIN SERPL ELPH-MCNC: 4.5 G/DL
ALP BLD-CCNC: 40 U/L
ALT SERPL-CCNC: 23 U/L
ANION GAP SERPL CALC-SCNC: 16 MMOL/L
APTT BLD: 31.1 SEC
AST SERPL-CCNC: 21 U/L
BASOPHILS # BLD AUTO: 0.03 K/UL
BASOPHILS NFR BLD AUTO: 0.5 %
BILIRUB SERPL-MCNC: 0.4 MG/DL
BUN SERPL-MCNC: 15 MG/DL
CALCIUM SERPL-MCNC: 9.4 MG/DL
CHLORIDE SERPL-SCNC: 105 MMOL/L
CO2 SERPL-SCNC: 18 MMOL/L
CREAT SERPL-MCNC: 0.93 MG/DL
EGFR: 86 ML/MIN/1.73M2
EOSINOPHIL # BLD AUTO: 0.01 K/UL
EOSINOPHIL NFR BLD AUTO: 0.2 %
GLUCOSE SERPL-MCNC: 124 MG/DL
HCT VFR BLD CALC: 45.4 %
HGB BLD-MCNC: 15.7 G/DL
IMM GRANULOCYTES NFR BLD AUTO: 0.3 %
INR PPP: 1.06 RATIO
LYMPHOCYTES # BLD AUTO: 0.94 K/UL
LYMPHOCYTES NFR BLD AUTO: 14.4 %
MAN DIFF?: NORMAL
MCHC RBC-ENTMCNC: 33.8 PG
MCHC RBC-ENTMCNC: 34.6 GM/DL
MCV RBC AUTO: 97.8 FL
MONOCYTES # BLD AUTO: 0.23 K/UL
MONOCYTES NFR BLD AUTO: 3.5 %
NEUTROPHILS # BLD AUTO: 5.28 K/UL
NEUTROPHILS NFR BLD AUTO: 81.1 %
PLATELET # BLD AUTO: 305 K/UL
POTASSIUM SERPL-SCNC: 4.2 MMOL/L
PROT SERPL-MCNC: 7.4 G/DL
PT BLD: 12.3 SEC
RBC # BLD: 4.64 M/UL
RBC # FLD: 15.5 %
SODIUM SERPL-SCNC: 139 MMOL/L
WBC # FLD AUTO: 6.51 K/UL

## 2023-02-22 RX ORDER — FLUTICASONE PROPIONATE AND SALMETEROL 500; 50 UG/1; UG/1
500-50 POWDER RESPIRATORY (INHALATION)
Qty: 3 | Refills: 3 | Status: ACTIVE | COMMUNITY
Start: 2023-02-22 | End: 1900-01-01

## 2023-02-22 RX ORDER — BUDESONIDE AND FORMOTEROL FUMARATE DIHYDRATE 160; 4.5 UG/1; UG/1
160-4.5 AEROSOL RESPIRATORY (INHALATION)
Qty: 1 | Refills: 5 | Status: DISCONTINUED | COMMUNITY
Start: 2018-10-30 | End: 2023-02-22

## 2023-03-06 ENCOUNTER — APPOINTMENT (OUTPATIENT)
Dept: PULMONOLOGY | Facility: CLINIC | Age: 73
End: 2023-03-06
Payer: MEDICARE

## 2023-03-06 VITALS
BODY MASS INDEX: 25.31 KG/M2 | TEMPERATURE: 97.2 F | WEIGHT: 191 LBS | HEART RATE: 81 BPM | RESPIRATION RATE: 17 BRPM | SYSTOLIC BLOOD PRESSURE: 128 MMHG | HEIGHT: 73 IN | OXYGEN SATURATION: 96 % | DIASTOLIC BLOOD PRESSURE: 73 MMHG

## 2023-03-06 PROCEDURE — 99215 OFFICE O/P EST HI 40 MIN: CPT

## 2023-03-08 ENCOUNTER — NON-APPOINTMENT (OUTPATIENT)
Age: 73
End: 2023-03-08

## 2023-03-13 NOTE — HISTORY OF PRESENT ILLNESS
[Current] : current [TextBox_4] : Interventional Pulmonology Consult Note\par \par 72 year-old M with PMH COPD, current tobacco use, bronchiectasis, and cardiomyopathy who presented to the clinic for evaluation for possible biopsy of a RUL lesion seen on PET/CT imaging. Patient underwent CT Chest on 1/5/23 which showed increase in a RUL consolidation and increase in soft tissue opacifying the airway, which could be indicative of mucus vs endobronchial lesion. He then underwent PET/CT which showed that this lesion was FDG-avid.\par \par He currently denies new pulmonary symptoms. He does experience some dyspnea on exertion, but denies cough and wheezing. He denies recent weight loss.

## 2023-03-13 NOTE — ASSESSMENT
[FreeTextEntry1] : 72 year-old M with PMH COPD and cardiomyopathy who presented to the clinic for evaluation for bronchoscopy. The plan will be for a navigational bronchoscopy with biopsy of the RUL abnormality.\par \par The differential diagnosis of the nodule based on location, history and appearance including malignancy, infectious etiology, inflammatory etiology and other etiologies was discussed\par \par \par The diagnostic yield of robotic assisted navigation assisted bronchoscopy with biopsy as well as EBUS with biopsy was discussed with the patient. The risk and benefits of bronchoscopy with navigation assisted transbronchial biopsy including risk of bleeding and  risk pneumothorax was discussed with the patient and they demonstrated understanding. In case of pneumothorax, we discussed the need for in hospital monitoring and chest tube placement. In case of bleeding, we explained that they may require inpatient or ICU admission. In case the lesion is suspicious for malignancy on preliminary or there is mediastinal or hilar adenopathy, the patient will need staging of the mediastinum with EBUS guided TBNA in the same procedure. The risk and benefits of EBUS including the risk of bleeding and risk of pneumothorax associated with EBUS was discussed with the patient and he demonstrated understanding. We will also send the tissue/BAL for culture to assess for infectious etiology during the procedure. The patient is agreeable to proceed with a navigation assisted bronchoscopy with biopsy of the lung lesion and EBUS with TBNA. The patient will undergo PST to assess candidacy of general anesthesia as well as discuss the risks and benefits with the anesthesiologist. Educational reading material regarding the procedure, risks and benefits provided to the patient in paper format. \par \par Will need COVID swab and presurgical testing prior to scheduling the procedure. The office will co-ordinate testing and pre-surgical appointment. Will need medical clearance. If cardiac co-morbidities noted, Will need additional cardiac clearance. Planned for flexible bronchoscopy, robotic assisted navigation bronchoscopy with biopsy of the lung lesion/rEBUS and evaluation of the hilum and mediastinum using linear EBUS on 3/28/23 pending a planning CT Scan of the CHEST with 1mm cuts. \par \par Discussed with Dr. Lomeli\par \par Vahid Kearney, PGY-7\par Pulmonary and Critical Care Medicine

## 2023-03-13 NOTE — DISCUSSION/SUMMARY
[FreeTextEntry1] : \par The patients most recent CT scan was reviewed by my independently and my interpretation is stated below:\par \par Interval increase in the consolidation of the right upper lobe. d/d malignancy versus infectious  vs inflammatory etiology.

## 2023-03-15 ENCOUNTER — OUTPATIENT (OUTPATIENT)
Dept: OUTPATIENT SERVICES | Facility: HOSPITAL | Age: 73
LOS: 1 days | End: 2023-03-15

## 2023-03-15 VITALS
WEIGHT: 179.9 LBS | TEMPERATURE: 97 F | DIASTOLIC BLOOD PRESSURE: 69 MMHG | SYSTOLIC BLOOD PRESSURE: 127 MMHG | RESPIRATION RATE: 14 BRPM | HEIGHT: 73 IN | OXYGEN SATURATION: 98 % | HEART RATE: 78 BPM

## 2023-03-15 DIAGNOSIS — R91.1 SOLITARY PULMONARY NODULE: ICD-10-CM

## 2023-03-15 DIAGNOSIS — Z90.89 ACQUIRED ABSENCE OF OTHER ORGANS: Chronic | ICD-10-CM

## 2023-03-15 DIAGNOSIS — R91.8 OTHER NONSPECIFIC ABNORMAL FINDING OF LUNG FIELD: ICD-10-CM

## 2023-03-15 DIAGNOSIS — Z98.890 OTHER SPECIFIED POSTPROCEDURAL STATES: Chronic | ICD-10-CM

## 2023-03-15 LAB
ALBUMIN SERPL ELPH-MCNC: 4.1 G/DL — SIGNIFICANT CHANGE UP (ref 3.3–5)
ALP SERPL-CCNC: 39 U/L — LOW (ref 40–120)
ALT FLD-CCNC: 18 U/L — SIGNIFICANT CHANGE UP (ref 4–41)
ANION GAP SERPL CALC-SCNC: 12 MMOL/L — SIGNIFICANT CHANGE UP (ref 7–14)
AST SERPL-CCNC: 15 U/L — SIGNIFICANT CHANGE UP (ref 4–40)
BILIRUB SERPL-MCNC: 0.6 MG/DL — SIGNIFICANT CHANGE UP (ref 0.2–1.2)
BUN SERPL-MCNC: 11 MG/DL — SIGNIFICANT CHANGE UP (ref 7–23)
CALCIUM SERPL-MCNC: 9.3 MG/DL — SIGNIFICANT CHANGE UP (ref 8.4–10.5)
CHLORIDE SERPL-SCNC: 104 MMOL/L — SIGNIFICANT CHANGE UP (ref 98–107)
CO2 SERPL-SCNC: 23 MMOL/L — SIGNIFICANT CHANGE UP (ref 22–31)
CREAT SERPL-MCNC: 1.02 MG/DL — SIGNIFICANT CHANGE UP (ref 0.5–1.3)
EGFR: 77 ML/MIN/1.73M2 — SIGNIFICANT CHANGE UP
GLUCOSE SERPL-MCNC: 104 MG/DL — HIGH (ref 70–99)
HCT VFR BLD CALC: 43.8 % — SIGNIFICANT CHANGE UP (ref 39–50)
HGB BLD-MCNC: 14.9 G/DL — SIGNIFICANT CHANGE UP (ref 13–17)
MCHC RBC-ENTMCNC: 33.1 PG — SIGNIFICANT CHANGE UP (ref 27–34)
MCHC RBC-ENTMCNC: 34 GM/DL — SIGNIFICANT CHANGE UP (ref 32–36)
MCV RBC AUTO: 97.3 FL — SIGNIFICANT CHANGE UP (ref 80–100)
NRBC # BLD: 0 /100 WBCS — SIGNIFICANT CHANGE UP (ref 0–0)
NRBC # FLD: 0 K/UL — SIGNIFICANT CHANGE UP (ref 0–0)
PLATELET # BLD AUTO: 284 K/UL — SIGNIFICANT CHANGE UP (ref 150–400)
POTASSIUM SERPL-MCNC: 3.8 MMOL/L — SIGNIFICANT CHANGE UP (ref 3.5–5.3)
POTASSIUM SERPL-SCNC: 3.8 MMOL/L — SIGNIFICANT CHANGE UP (ref 3.5–5.3)
PROT SERPL-MCNC: 7.2 G/DL — SIGNIFICANT CHANGE UP (ref 6–8.3)
RBC # BLD: 4.5 M/UL — SIGNIFICANT CHANGE UP (ref 4.2–5.8)
RBC # FLD: 15 % — HIGH (ref 10.3–14.5)
SODIUM SERPL-SCNC: 139 MMOL/L — SIGNIFICANT CHANGE UP (ref 135–145)
WBC # BLD: 7.93 K/UL — SIGNIFICANT CHANGE UP (ref 3.8–10.5)
WBC # FLD AUTO: 7.93 K/UL — SIGNIFICANT CHANGE UP (ref 3.8–10.5)

## 2023-03-15 RX ORDER — SODIUM CHLORIDE 9 MG/ML
1000 INJECTION, SOLUTION INTRAVENOUS
Refills: 0 | Status: DISCONTINUED | OUTPATIENT
Start: 2023-03-24 | End: 2023-04-07

## 2023-03-15 RX ORDER — BUDESONIDE AND FORMOTEROL FUMARATE DIHYDRATE 160; 4.5 UG/1; UG/1
2 AEROSOL RESPIRATORY (INHALATION)
Qty: 0 | Refills: 0 | DISCHARGE

## 2023-03-15 RX ORDER — OXYCODONE AND ACETAMINOPHEN 5; 325 MG/1; MG/1
1 TABLET ORAL
Qty: 0 | Refills: 0 | DISCHARGE

## 2023-03-15 RX ORDER — ALBUTEROL 90 UG/1
2 AEROSOL, METERED ORAL
Qty: 0 | Refills: 0 | DISCHARGE

## 2023-03-15 NOTE — H&P PST ADULT - NSANTHOSAYNRD_GEN_A_CORE
No. TRIPP screening performed.  STOP BANG Legend: 0-2 = LOW Risk; 3-4 = INTERMEDIATE Risk; 5-8 = HIGH Risk

## 2023-03-15 NOTE — H&P PST ADULT - NSICDXPASTMEDICALHX_GEN_ALL_CORE_FT
PAST MEDICAL HISTORY:  Bladder cancer     Chronic obstructive pulmonary disease (COPD)     History of BPH     HTN (hypertension)     Sciatica      PAST MEDICAL HISTORY:  Bladder cancer     Chronic obstructive pulmonary disease (COPD)     H/O bronchiectasis     H/O cardiomyopathy     History of BPH     HTN (hypertension)     Sciatica

## 2023-03-15 NOTE — H&P PST ADULT - GASTROINTESTINAL
details… normal/soft/nontender/nondistended/normal active bowel sounds/no guarding/no rigidity/no organomegaly/no palpable anjana/no masses palpable

## 2023-03-15 NOTE — H&P PST ADULT - CARDIOVASCULAR COMMENTS
sob on exertion for the past 2 yrs, denies worsening of symptoms dx with copd on prednisone, also dx with cardiomyopathy was evaluated by cardiologist

## 2023-03-15 NOTE — H&P PST ADULT - PROBLEM SELECTOR PLAN 1
Pt scheduled for [facility] robotic navigational endobronchial US bronchoscopy, radial endobronchial US/ linear with cytology on 3/24/2023. labs done results pending, ekg in chart.  Preop teaching done, pt able to verbalize understanding.   medication day of procedure- wixela, entresto, prednisone, pepcid   anesthesia- hx cardiomyopathy, TRIPP precautions, copd   pst request  cardiology eval Dr. Patino [phone]  echo 2022 [phone]  cardiac cath 2022 in chart  pfts in chart

## 2023-03-23 ENCOUNTER — TRANSCRIPTION ENCOUNTER (OUTPATIENT)
Age: 73
End: 2023-03-23

## 2023-03-23 NOTE — ASU PATIENT PROFILE, ADULT - CAREGIVER ADDRESS
Patient requests all Lab, Cardiology, and Radiology Results on their Discharge Instructions [place]

## 2023-03-23 NOTE — ASU PATIENT PROFILE, ADULT - FALL HARM RISK - HARM RISK INTERVENTIONS

## 2023-03-23 NOTE — ASU PATIENT PROFILE, ADULT - NSICDXPASTMEDICALHX_GEN_ALL_CORE_FT
PAST MEDICAL HISTORY:  Bladder cancer     Chronic obstructive pulmonary disease (COPD)     H/O bronchiectasis     H/O cardiomyopathy     History of BPH     HTN (hypertension)     Sciatica

## 2023-03-24 ENCOUNTER — RESULT REVIEW (OUTPATIENT)
Age: 73
End: 2023-03-24

## 2023-03-24 ENCOUNTER — APPOINTMENT (OUTPATIENT)
Dept: PULMONOLOGY | Facility: HOSPITAL | Age: 73
End: 2023-03-24

## 2023-03-24 ENCOUNTER — OUTPATIENT (OUTPATIENT)
Dept: OUTPATIENT SERVICES | Facility: HOSPITAL | Age: 73
LOS: 1 days | Discharge: HOME | End: 2023-03-24
Payer: MEDICARE

## 2023-03-24 ENCOUNTER — TRANSCRIPTION ENCOUNTER (OUTPATIENT)
Age: 73
End: 2023-03-24

## 2023-03-24 VITALS
WEIGHT: 179.9 LBS | HEART RATE: 60 BPM | DIASTOLIC BLOOD PRESSURE: 75 MMHG | TEMPERATURE: 98 F | HEIGHT: 60 IN | SYSTOLIC BLOOD PRESSURE: 121 MMHG | RESPIRATION RATE: 16 BRPM | OXYGEN SATURATION: 98 %

## 2023-03-24 VITALS
SYSTOLIC BLOOD PRESSURE: 109 MMHG | HEART RATE: 62 BPM | RESPIRATION RATE: 17 BRPM | DIASTOLIC BLOOD PRESSURE: 78 MMHG | OXYGEN SATURATION: 97 % | TEMPERATURE: 98 F

## 2023-03-24 DIAGNOSIS — Z98.890 OTHER SPECIFIED POSTPROCEDURAL STATES: Chronic | ICD-10-CM

## 2023-03-24 DIAGNOSIS — R91.8 OTHER NONSPECIFIC ABNORMAL FINDING OF LUNG FIELD: ICD-10-CM

## 2023-03-24 DIAGNOSIS — Z90.89 ACQUIRED ABSENCE OF OTHER ORGANS: Chronic | ICD-10-CM

## 2023-03-24 LAB
GRAM STN FLD: SIGNIFICANT CHANGE UP
SPECIMEN SOURCE: SIGNIFICANT CHANGE UP

## 2023-03-24 PROCEDURE — 31645 BRNCHSC W/THER ASPIR 1ST: CPT | Mod: GC

## 2023-03-24 PROCEDURE — 31628 BRONCHOSCOPY/LUNG BX EACH: CPT | Mod: GC

## 2023-03-24 PROCEDURE — 88305 TISSUE EXAM BY PATHOLOGIST: CPT | Mod: 26

## 2023-03-24 PROCEDURE — 31627 NAVIGATIONAL BRONCHOSCOPY: CPT | Mod: GC

## 2023-03-24 PROCEDURE — 88172 CYTP DX EVAL FNA 1ST EA SITE: CPT | Mod: 26

## 2023-03-24 PROCEDURE — 88173 CYTOPATH EVAL FNA REPORT: CPT | Mod: 26

## 2023-03-24 PROCEDURE — 31654 BRONCH EBUS IVNTJ PERPH LES: CPT | Mod: GC

## 2023-03-24 PROCEDURE — 88112 CYTOPATH CELL ENHANCE TECH: CPT | Mod: 26,59

## 2023-03-24 PROCEDURE — 71045 X-RAY EXAM CHEST 1 VIEW: CPT | Mod: 26

## 2023-03-24 PROCEDURE — 31624 DX BRONCHOSCOPE/LAVAGE: CPT | Mod: GC

## 2023-03-24 PROCEDURE — 31652 BRONCH EBUS SAMPLNG 1/2 NODE: CPT | Mod: GC

## 2023-03-24 RX ORDER — FENTANYL CITRATE 50 UG/ML
25 INJECTION INTRAVENOUS
Refills: 0 | Status: DISCONTINUED | OUTPATIENT
Start: 2023-03-24 | End: 2023-03-24

## 2023-03-24 RX ORDER — SODIUM CHLORIDE 9 MG/ML
500 INJECTION, SOLUTION INTRAVENOUS ONCE
Refills: 0 | Status: COMPLETED | OUTPATIENT
Start: 2023-03-24 | End: 2023-03-24

## 2023-03-24 RX ORDER — FENTANYL CITRATE 50 UG/ML
50 INJECTION INTRAVENOUS ONCE
Refills: 0 | Status: DISCONTINUED | OUTPATIENT
Start: 2023-03-24 | End: 2023-03-24

## 2023-03-24 RX ORDER — SODIUM CHLORIDE 9 MG/ML
500 INJECTION, SOLUTION INTRAVENOUS ONCE
Refills: 0 | Status: DISCONTINUED | OUTPATIENT
Start: 2023-03-24 | End: 2023-04-07

## 2023-03-24 RX ADMIN — SODIUM CHLORIDE 1000 MILLILITER(S): 9 INJECTION, SOLUTION INTRAVENOUS at 14:40

## 2023-03-24 NOTE — ASU DISCHARGE PLAN (ADULT/PEDIATRIC) - HISTORY OF COVID-19 VACCINATION
Mobile Cardiac Telemetry (MCOT) Instructions     On 11/17/22 you had a Mobile Cardiac Telemetry Monitor (MCOT).  The monitor is to be worn for 2 weeks. Your monitor completion date is 12/01/2022.  The monitor continuously records your heart rhythm.  Any recorded events are immediately sent to a cardiac technician for review.    Daily Use and Operation    DO  Keep the monitor within 30 feet of you at all times.  When you feel a symptom, press the \"Record Symptoms\" button and follow prompts on monitor.  Shower or exercise as normal while wearing the MCOT Patch.  Do not swim or take a bath.  Patch is water-resistant, not waterproof.  Follow your normal routine.  Don't avoid stress, work, or exercise.  It is important to record your heartbeat during your normal daily activities.  When the battery is low, use the supplied .  The monitor will show a warning message when the battery is low.  Charge the monitor daily with the  provided.  Address all alerts on your monitor promptly.    DO NOT  Bathe or swim with any monitor components.  The monitor CAN be worn in the shower.  Remove the patch from your skin after you begin monitoring.  With normal wear, each patch should last 5-7 days.    GETTING STARTED  Wash and dry the area where patch is to be applied. (SHAVE if needed prior to washing and drying)  Once skin is dry, scrub the area with the provided scrub pad for one minute.  Do not apply lotions or oils to the area.  Remove a patch from the MCOT Patch Pouch.  Place sensor into patch and press down firmly to snap it in place.  Locate the patch placement template and follow it's instructions for use.  Remove clear backing and apply patch to your chest using the patch placement template as a guide.  It may be helpful to use a mirror for guidance.  Remove template when finished.  Press patch firmly against your skin then remove top white paper.  Push on the power button on the monitor.  Follow the on screen  guidance to complete the set-up.    CHANGING PATCHES  Power off the monitor.  Remove the patch by pulling the clear adhesive away from your body.  Apply downward pressure on the tab to snap/break it off.  This will require some force.  Hold and slide the sensor forward to remove it from patch.  Discard the used patch, NOT THE SENSOR. The sensor will be re-used on a new patch after sensor is recharged.  Charge the sensor and charge the monitor.  Charging of both devices can take up to 90 minutes.  Once sensor is fully charged, apply a new patch to your chest.    WHAT TO EXPECT  Your physician prescribes a [employer] device for you.  The monitor is applied in the office or shipped to your home. *Device will be received within 2 business days after Insurance Verification has been completed.  Start your monitoring service within 24 hours of receipt of the monitor if it is mailed to you. For assistance, follow the steps in the quick start instructions or the Patient Education Guide in the kit, or watch the video tutorial at [email or web address] customer Support team may call you. Please answer a call that may come from an [phone] (toll-free) number or ([phone]) area code.  Trained cardiac technicians from [employer] [facility] review data during and at the end of your monitoring period, and in some cases may contact your physician.  Reports and alerts posted to an online portal for your physician to review and follow-up with you.  Return the equipment via pre-paid shipping envelope.  Reports are provided to physician to interpret, diagnosis and follow up with you.    TROUBLESHOOTING  A \"No Communication\" message on your monitor means the sensor is out of range.  To resolve the issue, keep the monitor within a range of 30 feet.  If the issue persists more than 15 minutes, contact Customer Service at [phone].  If you are in an area with no or limited cellular coverage you may receive a message.  To resolve the  Yes issue, move to an area with cellular coverage.  If you are unable to do so, the monitor will store the data and transmit when service becomes available.  If you experience discomfort with the patch anytime during monitoring, a lead wire adapter is available in the kit for alternate use.    How to Return monitor at End of Service  Returning your monitor promptly is important.  Simply pack up the device and components back into the kit.  Place the kit into the enclosed postage-paid bag and follow the instructions to return the device.

## 2023-03-24 NOTE — BRIEF OPERATIVE NOTE - NSICDXBRIEFPROCEDURE_GEN_ALL_CORE_FT
PROCEDURES:  Navigational bronchoscopy 24-Mar-2023 14:11:55  Kiki Lynn  Bronchoscopy, with EBUS and bronchoalveolar lavage 24-Mar-2023 14:12:07  Kiki Lynn

## 2023-03-24 NOTE — ASU DISCHARGE PLAN (ADULT/PEDIATRIC) - CARE PROVIDER_API CALL
Yariel Lomeli)  Critical Care Medicine; Internal Medicine; Pulmonary Disease  [address] [place] [address]  Phone: [phone]  Fax: [phone]  Follow Up Time:

## 2023-03-24 NOTE — ASU DISCHARGE PLAN (ADULT/PEDIATRIC) - CALL YOUR DOCTOR IF YOU HAVE ANY OF THE FOLLOWING:
Bleeding that does not stop/Swelling that gets worse/Pain not relieved by Medications/Fever greater than (need to indicate Fahrenheit or Celsius)/Nausea and vomiting that does not stop/Increased irritability or sluggishness

## 2023-03-24 NOTE — BRIEF OPERATIVE NOTE - OPERATION/FINDINGS
[place] assisted navigational bronchoscopy with RUL nodule biopsy performed. BAL of RUL performed. EBUS with lymph node 4R biopsy. Airway inspection with therapeutic aspiration of secretions. No active bleeding at end of procedure. [place] assisted navigational bronchoscopy with RUL nodule biopsy performed. BAL of RUL performed. EBUS with lymph node 4R biopsy. Airway inspection with therapeutic aspiration of secretions. No active bleeding at end of procedure.    #[id number]

## 2023-03-24 NOTE — ASU DISCHARGE PLAN (ADULT/PEDIATRIC) - NS MD DC FALL RISK RISK
For information on Fall & Injury Prevention, visit: [email or web address] OR  [email or web address] OR  https://www.cdc.gov/steadi/patient.html

## 2023-03-25 LAB
NIGHT BLUE STAIN TISS: SIGNIFICANT CHANGE UP
SPECIMEN SOURCE: SIGNIFICANT CHANGE UP

## 2023-03-26 LAB
CULTURE RESULTS: SIGNIFICANT CHANGE UP
SPECIMEN SOURCE: SIGNIFICANT CHANGE UP

## 2023-03-27 LAB — NON-GYNECOLOGICAL CYTOLOGY STUDY: SIGNIFICANT CHANGE UP

## 2023-03-28 PROBLEM — Z86.79 PERSONAL HISTORY OF OTHER DISEASES OF THE CIRCULATORY SYSTEM: Chronic | Status: ACTIVE | Noted: 2023-03-15

## 2023-03-28 PROBLEM — Z87.438 PERSONAL HISTORY OF OTHER DISEASES OF MALE GENITAL ORGANS: Chronic | Status: ACTIVE | Noted: 2023-03-15

## 2023-03-28 PROBLEM — J44.9 CHRONIC OBSTRUCTIVE PULMONARY DISEASE, UNSPECIFIED: Chronic | Status: ACTIVE | Noted: 2023-03-15

## 2023-03-28 PROBLEM — C67.9 MALIGNANT NEOPLASM OF BLADDER, UNSPECIFIED: Chronic | Status: ACTIVE | Noted: 2023-03-15

## 2023-03-28 PROBLEM — Z87.09 PERSONAL HISTORY OF OTHER DISEASES OF THE RESPIRATORY SYSTEM: Chronic | Status: ACTIVE | Noted: 2023-03-15

## 2023-04-03 ENCOUNTER — APPOINTMENT (OUTPATIENT)
Dept: PULMONOLOGY | Facility: CLINIC | Age: 73
End: 2023-04-03
Payer: MEDICARE

## 2023-04-03 VITALS
RESPIRATION RATE: 15 BRPM | HEART RATE: 88 BPM | OXYGEN SATURATION: 97 % | HEIGHT: 73 IN | SYSTOLIC BLOOD PRESSURE: 141 MMHG | WEIGHT: 194 LBS | DIASTOLIC BLOOD PRESSURE: 77 MMHG | TEMPERATURE: 97.3 F | BODY MASS INDEX: 25.71 KG/M2

## 2023-04-03 PROCEDURE — 99214 OFFICE O/P EST MOD 30 MIN: CPT

## 2023-04-19 LAB
CULTURE RESULTS: SIGNIFICANT CHANGE UP
SPECIMEN SOURCE: SIGNIFICANT CHANGE UP

## 2023-05-01 NOTE — ASSESSMENT
[FreeTextEntry1] : 73 year-old M with PMH COPD and cardiomyopathy presenting for follow up after diagnostic eval for abnormal Ct chest\par \par s/p 3/24 [place] assisted navigational bronchoscopy with RUL nodule biopsy performed. BAL of RUL performed. EBUS with lymph node 4R biopsy. Airway inspection with therapeutic aspiration of secretions. No active bleeding at end of procedure.\par \par Aome acid fast growth not yet finalized and possible aspergillus versicolor on culture\par Cytology with histocytes, mucin, negative for malignant cells, and favor reactive \par \par #recommendations\par relayed results to patient, will be following cultures and further treatment with his pulmonologist Dr. Jacob. Results sent over to Dr. Jacob. \par \par Also will need surveillance imaging for the nodule. Explained importnace of clincoradiographic follow up to ensure biopsy not false negative. Demonstrated understanding. \par \par RTC prn. \par

## 2023-05-01 NOTE — HISTORY OF PRESENT ILLNESS
[TextBox_4] : 73 year-old M with PMH COPD and cardiomyopathy presenting for follow up after diagnostic eval for abnormal Ct chest\par \par s/p 3/24 [place] assisted navigational bronchoscopy with RUL nodule biopsy performed. BAL of RUL performed. EBUS with lymph node 4R biopsy. Airway inspection with therapeutic aspiration of secretions. No active bleeding at end of procedure.\par some acid fast growth not yet finalized and possible aspergillus versicolor on culture\par cytology with histocytes, mucin, negative for malignant cells, and favor reactive \par \par patient tolerated procedure without difficulties feeling well, has his chronic cough that is productive although says that it has decreased since procedure \par

## 2023-05-03 ENCOUNTER — NON-APPOINTMENT (OUTPATIENT)
Age: 73
End: 2023-05-03

## 2023-05-17 LAB
CULTURE RESULTS: SIGNIFICANT CHANGE UP
SPECIMEN SOURCE: SIGNIFICANT CHANGE UP

## 2023-05-31 ENCOUNTER — APPOINTMENT (OUTPATIENT)
Dept: PULMONOLOGY | Facility: CLINIC | Age: 73
End: 2023-05-31
Payer: MEDICARE

## 2023-05-31 VITALS
BODY MASS INDEX: 24.92 KG/M2 | WEIGHT: 188 LBS | TEMPERATURE: 97.9 F | OXYGEN SATURATION: 97 % | HEART RATE: 100 BPM | HEIGHT: 73 IN | DIASTOLIC BLOOD PRESSURE: 77 MMHG | SYSTOLIC BLOOD PRESSURE: 115 MMHG

## 2023-05-31 DIAGNOSIS — A31.0 PULMONARY MYCOBACTERIAL INFECTION: ICD-10-CM

## 2023-05-31 PROCEDURE — 99214 OFFICE O/P EST MOD 30 MIN: CPT

## 2023-05-31 RX ORDER — BUDESONIDE AND FORMOTEROL FUMARATE DIHYDRATE 160; 4.5 UG/1; UG/1
160-4.5 AEROSOL RESPIRATORY (INHALATION)
Qty: 10.2 | Refills: 3 | Status: DISCONTINUED | COMMUNITY
Start: 2020-03-16 | End: 2023-05-31

## 2023-05-31 RX ORDER — AZITHROMYCIN 250 MG/1
250 TABLET, FILM COATED ORAL
Qty: 10 | Refills: 0 | Status: DISCONTINUED | COMMUNITY
Start: 2023-02-01 | End: 2023-05-31

## 2023-06-01 NOTE — ASSESSMENT
[FreeTextEntry1] : Recc.  Avoiding prednisone if possible due to NTM\par Recc. seeing GI\par gave pt samples of spiriva to add to Wixella \par Discontinue smoking.  Urged.  Patient with progressive COPD.\par cont to f/u with cardio\par Would not treat for nontuberculous mycobacteria unless significant progressive disease.\par F/u 2 months PFT and appt. Repeat CT prior to visit.\par \par 35 minutes spent in evaluation review of studies and management.

## 2023-06-01 NOTE — HISTORY OF PRESENT ILLNESS
[Current] : current [TextBox_4] : Had bronchoscopy, had biopsy ,negative for malignancy, positive for Mycobacterium chimera intracellulare\par Feels is improved when on prednisone 5 mg/day\par On Wixella not taking Spiriva. \par Respiratory status stable.\par on new medication for cardiomyopathy,entresto and feeling more fatigue\par has been off 5 mg prednisone for past 10 days, was on it for over 1 year\par \par saw urologist recently\par has not seen Gi\par \par smoking less down to 3-4 day\par \par \par \par \par \par \par \par \par

## 2023-06-01 NOTE — PHYSICAL EXAM
[No Acute Distress] : no acute distress [Normal Oropharynx] : normal oropharynx [Normal Appearance] : normal appearance [Supple] : supple [Thyroid Not Enlarged] : thyroid not enlarged [No Neck Mass] : no neck mass [Normal S1, S2] : normal s1, s2 [No JVD] : no jvd [No Murmurs] : no murmurs [Normal to Percussion] : normal to percussion [No Abnormalities] : no abnormalities [Benign] : benign [No Clubbing] : no clubbing [No Cyanosis] : no cyanosis [No Edema] : no edema [TextBox_68] : Prolongation of expiration with rare rhonchi and wheeze.

## 2023-06-01 NOTE — DISCUSSION/SUMMARY
[FreeTextEntry1] : New and increasing right upper lobe density.  Biopsy positive for nontuberculous mycobacterial disease.  Still cannot completely exclude malignancy.  Need to continue to observe.\par Chronic obstructive pulmonary disease.\par Current every day smoker.\par pulmonary nodules.  1 new nodule.\par mild bronchiectasis \par Dyspnea on exertion likely multifactorial related to underlying pulmonary and cardiac disease.\par Uptake in prostate gland on pet imaging followed by urology.\par Uptake in esophagus recommended GI evaluation.\par Current everyday smoker.

## 2023-06-01 NOTE — PROCEDURE
[FreeTextEntry1] : Bronchoscopy results noted\par \par 2/13/23 PET scan \par Uptake RUL density\par Esophageal and prostate uptake\par \par CT 3/9/23\par \par 02/21/2023\par Pulmonary function testing\par These data demonstrate a moderate obstructive ventilatory deficit. There is elevation in the RV/TLC ratio indicative of possible air trapping. There is a moderate diffusion impairment. \par Compared to prior study of August 2022 there is a decrease in FVC and only mild decrease in FEV1.  Diffusion capacity is stable.\par \par

## 2023-07-28 RX ORDER — ALBUTEROL SULFATE 90 UG/1
108 (90 BASE) AEROSOL, METERED RESPIRATORY (INHALATION)
Qty: 1 | Refills: 3 | Status: ACTIVE | COMMUNITY
Start: 2022-10-14 | End: 1900-01-01

## 2023-08-11 ENCOUNTER — NON-APPOINTMENT (OUTPATIENT)
Age: 73
End: 2023-08-11

## 2023-08-16 ENCOUNTER — NON-APPOINTMENT (OUTPATIENT)
Age: 73
End: 2023-08-16

## 2023-08-23 ENCOUNTER — APPOINTMENT (OUTPATIENT)
Dept: PULMONOLOGY | Facility: CLINIC | Age: 73
End: 2023-08-23
Payer: MEDICARE

## 2023-08-23 VITALS
WEIGHT: 188 LBS | HEART RATE: 89 BPM | SYSTOLIC BLOOD PRESSURE: 134 MMHG | HEIGHT: 73 IN | TEMPERATURE: 98.1 F | DIASTOLIC BLOOD PRESSURE: 93 MMHG | BODY MASS INDEX: 24.92 KG/M2 | OXYGEN SATURATION: 95 %

## 2023-08-23 LAB — HEMOGLOBIN: 14.4

## 2023-08-23 PROCEDURE — 85018 HEMOGLOBIN: CPT | Mod: QW

## 2023-08-23 PROCEDURE — 94010 BREATHING CAPACITY TEST: CPT

## 2023-08-23 PROCEDURE — 94726 PLETHYSMOGRAPHY LUNG VOLUMES: CPT

## 2023-08-23 PROCEDURE — 99214 OFFICE O/P EST MOD 30 MIN: CPT | Mod: 25

## 2023-08-23 PROCEDURE — ZZZZZ: CPT

## 2023-08-23 PROCEDURE — 94729 DIFFUSING CAPACITY: CPT

## 2023-08-23 RX ORDER — BUDESONIDE 0.5 MG/2ML
0.5 INHALANT ORAL
Qty: 60 | Refills: 5 | Status: ACTIVE | COMMUNITY
Start: 2023-08-23 | End: 1900-01-01

## 2023-08-23 RX ORDER — BLOOD-GLUCOSE METER
KIT MISCELLANEOUS
Qty: 1 | Refills: 0 | Status: ACTIVE | COMMUNITY
Start: 2023-08-23 | End: 1900-01-01

## 2023-08-23 RX ORDER — PREDNISONE 5 MG/1
5 TABLET ORAL
Qty: 30 | Refills: 2 | Status: DISCONTINUED | COMMUNITY
Start: 2020-12-04 | End: 2023-08-23

## 2023-08-23 RX ORDER — IPRATROPIUM BROMIDE AND ALBUTEROL SULFATE 2.5; .5 MG/3ML; MG/3ML
0.5-2.5 (3) SOLUTION RESPIRATORY (INHALATION)
Qty: 1 | Refills: 5 | Status: ACTIVE | COMMUNITY
Start: 2023-08-23 | End: 1900-01-01

## 2023-08-23 RX ORDER — TIOTROPIUM BROMIDE INHALATION SPRAY 3.12 UG/1
2.5 SPRAY, METERED RESPIRATORY (INHALATION)
Refills: 0 | Status: DISCONTINUED | COMMUNITY
End: 2023-08-23

## 2023-08-23 NOTE — ASSESSMENT
[FreeTextEntry1] : change to nebulizer medication DuoNeb and budesonide bid, sent to [facility] Recc. seeing GI, reinforce to go Consultation from interventional Bronchoscopy for repeat procedure to ascertain if persistent evidence of infection with nontuberculous mycobacteria or Aspergillus.  Also biopsy to exclude malignancy. Discontinue smoking.  Urged.  Patient with progressive COPD. cont to f/u with cardio  35 minutes spent in evaluation review of studies and management.

## 2023-08-23 NOTE — PROCEDURE
[FreeTextEntry1] : 08/23/2023 Pulmonary function testing Normal Flow Rates Normal Lung Volumes. There is a mild diffusion impairment. Corrects to normal with lung volume correction  Compared to August 2022 there is a very mild decrease in function.   Bronchoscopy results noted.  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  Ct chest August 16th, 2023, reviewed and discussed with patient.  Compared to prior. Progression of disease in right upper lobe although distribution somewhat different.  2/13/23 PET scan  Uptake RUL density Esophageal and prostate uptake

## 2023-08-23 NOTE — DISCUSSION/SUMMARY
[FreeTextEntry1] : Increasing right upper lobe density.  BAL positive for NTM and rare Aspergillus.  May be related to infectious etiology. Still cannot completely exclude malignancy.   Chronic obstructive pulmonary disease. Current every day smoker. pulmonary nodules.  1 new nodule. mild bronchiectasis  Dyspnea on exertion likely multifactorial related to underlying pulmonary and cardiac disease. Uptake in prostate gland on pet imaging followed by urology. Uptake in esophagus recommended GI evaluation.

## 2023-08-23 NOTE — HISTORY OF PRESENT ILLNESS
[Current] : current [TextBox_4] : Had F/U CT Needs colonoscopy and workup colorectal.  No significant change in resp. status.  Some cough.  No chest pain or hemoptysis. Positive cardiac issues.  Has not seen GI about esophagus Still smoking.

## 2023-08-31 NOTE — H&P PST ADULT - PRO INTERPRETER NEED 2
Patient ID: Janiya is a 70 year old female.    Chief Compliant  Chief Complaint   Patient presents with   • Office Visit   • Follow-up     Pt is here for BP review.        HPI    70 yr old female here for HTN follow up.     HTN - blood pressure well controlled today. Patient reports she has been taking increased dose of lisinopril 10 mg. Denies any side effects. Denies chest pain, shortness of breath, vision changes or headaches.     Review of Systems:  Review of Systems   Constitutional: Negative for fatigue and fever.   HENT: Negative for congestion.    Respiratory: Negative for cough, shortness of breath and wheezing.    Cardiovascular: Negative for chest pain.   Gastrointestinal: Negative for abdominal distention and abdominal pain.   Genitourinary: Negative for dysuria and hematuria.   Musculoskeletal: Negative for arthralgias and back pain.   Skin: Negative for color change and pallor.   Neurological: Negative for dizziness and headaches.   Psychiatric/Behavioral: Negative.        Physical Exam:  Physical Exam  Constitutional:       Appearance: She is well-developed.   HENT:      Head: Normocephalic.   Eyes:      Conjunctiva/sclera: Conjunctivae normal.      Pupils: Pupils are equal, round, and reactive to light.   Cardiovascular:      Rate and Rhythm: Normal rate and regular rhythm.      Heart sounds: Normal heart sounds.   Pulmonary:      Effort: Pulmonary effort is normal. No respiratory distress.      Breath sounds: Normal breath sounds. No wheezing.   Abdominal:      General: There is no distension.      Palpations: Abdomen is soft.      Tenderness: There is no abdominal tenderness.   Musculoskeletal:         General: Normal range of motion.      Cervical back: Normal range of motion.   Skin:     General: Skin is warm and dry.   Neurological:      Mental Status: She is alert and oriented to person, place, and time.           Vitals  Visit Vitals  /84 (BP Location: RUE - Right upper extremity, Patient  Position: Sitting, Cuff Size: Regular)   Pulse 68   Temp 97.8 °F (36.6 °C) (Temporal)   Ht 5' 7\" (1.702 m)   Wt 77.6 kg (171 lb)   SpO2 99%   BMI 26.78 kg/m²       Allergies   ALLERGIES:  No Known Allergies  Family History   Family History   Problem Relation Age of Onset   • Cancer Mother    • Cancer Father      Meds  Outpatient Current Medications as of as of 8/31/2023       Disp Refills Start End    lisinopril (ZESTRIL) 10 MG tablet (Taking) 90 tablet 3 8/31/2023 8/30/2024    Sig - Route: Take 1 tablet by mouth daily. - Oral    Class: Eprescribe    Eliquis 5 MG Tab (Taking)   7/11/2023     Sig - Route: Take 5 mg by mouth in the morning and 5 mg in the evening. - Oral    Class: Historical Med    folic acid (FOLATE) 1 MG tablet (Taking)   7/3/2023     Sig - Route: Take 1,000 mcg by mouth daily. - Oral    Class: Historical Med    acetaminophen (TYLENOL) 500 MG tablet (Taking)   5/28/2021     Sig - Route: Take 1 tablet by mouth every 4 hours as needed for Pain. - Oral    Class: OTC    Renewals     Renewal requests to authorizing provider (Laura Tomlinson MD) <b>prohibited</b>    Renewal provider: Dorian Lane MD          dorzolamide-timolol (COSOPT) 22.3-6.8 MG/ML ophthalmic solution (Taking)   5/4/2021     Sig - Route: Place 1 drop into both eyes daily. - Both Eyes    Class: Historical Med    latanoprost (XALATAN) 0.005 % ophthalmic solution (Taking)   4/6/2021     Sig: INSTILL 1 DROP INTO BOTH EYES AT BEDTIME    Class: Historical Med        Social History  Social History     Socioeconomic History   • Marital status: /Civil Union     Spouse name: Not on file   • Number of children: Not on file   • Years of education: Not on file   • Highest education level: Not on file   Occupational History   • Not on file   Tobacco Use   • Smoking status: Former     Types: Cigarettes     Passive exposure: Past   • Smokeless tobacco: Never   • Tobacco comments:     Quit smoking 10 years ago   Vaping Use   • Vaping Use:  never used   Substance and Sexual Activity   • Alcohol use: Never   • Drug use: Never   • Sexual activity: Not Currently   Other Topics Concern   • Not on file   Social History Narrative   • Not on file     Social Determinants of Health     Financial Resource Strain: Not on file   Food Insecurity: Not on file   Transportation Needs: Not on file   Physical Activity: Not on file   Stress: Not on file   Social Connections: Not on file   Intimate Partner Violence: Not on file       Assessment & Plan  Janiya was seen today for office visit and follow-up.    Diagnoses and all orders for this visit:    Benign essential HTN  -     lisinopril (ZESTRIL) 10 MG tablet; Take 1 tablet by mouth daily.      - blood pressure well controlled, patient taking medication as prescribed.   - follow up in 3 months       Patient agrees and understands with the above plan. All questions were answered to their understanding.    Janie Moffett MD   English

## 2023-09-11 ENCOUNTER — APPOINTMENT (OUTPATIENT)
Dept: PULMONOLOGY | Facility: CLINIC | Age: 73
End: 2023-09-11
Payer: MEDICARE

## 2023-09-11 VITALS
OXYGEN SATURATION: 99 % | TEMPERATURE: 97.6 F | HEART RATE: 86 BPM | WEIGHT: 180 LBS | HEIGHT: 73 IN | DIASTOLIC BLOOD PRESSURE: 82 MMHG | SYSTOLIC BLOOD PRESSURE: 130 MMHG | RESPIRATION RATE: 15 BRPM | BODY MASS INDEX: 23.86 KG/M2

## 2023-09-11 DIAGNOSIS — F17.200 NICOTINE DEPENDENCE, UNSPECIFIED, UNCOMPLICATED: ICD-10-CM

## 2023-09-11 PROCEDURE — 99215 OFFICE O/P EST HI 40 MIN: CPT

## 2023-09-12 LAB
ALBUMIN SERPL ELPH-MCNC: 4.2 G/DL
ALP BLD-CCNC: 40 U/L
ALT SERPL-CCNC: 14 U/L
ANION GAP SERPL CALC-SCNC: 13 MMOL/L
APTT BLD: 26.2 SEC
AST SERPL-CCNC: 14 U/L
BASOPHILS # BLD AUTO: 0.05 K/UL
BASOPHILS NFR BLD AUTO: 0.7 %
BILIRUB SERPL-MCNC: 0.4 MG/DL
BUN SERPL-MCNC: 15 MG/DL
CALCIUM SERPL-MCNC: 9.1 MG/DL
CHLORIDE SERPL-SCNC: 109 MMOL/L
CO2 SERPL-SCNC: 18 MMOL/L
CREAT SERPL-MCNC: 0.99 MG/DL
EGFR: 79 ML/MIN/1.73M2
EOSINOPHIL # BLD AUTO: 0.09 K/UL
EOSINOPHIL NFR BLD AUTO: 1.3 %
GLUCOSE SERPL-MCNC: 104 MG/DL
HCT VFR BLD CALC: 41.8 %
HGB BLD-MCNC: 14.2 G/DL
IMM GRANULOCYTES NFR BLD AUTO: 0.3 %
INR PPP: 0.98 RATIO
LYMPHOCYTES # BLD AUTO: 2.16 K/UL
LYMPHOCYTES NFR BLD AUTO: 30.2 %
MAN DIFF?: NORMAL
MCHC RBC-ENTMCNC: 33.3 PG
MCHC RBC-ENTMCNC: 34 GM/DL
MCV RBC AUTO: 97.9 FL
MONOCYTES # BLD AUTO: 0.56 K/UL
MONOCYTES NFR BLD AUTO: 7.8 %
NEUTROPHILS # BLD AUTO: 4.28 K/UL
NEUTROPHILS NFR BLD AUTO: 59.7 %
PLATELET # BLD AUTO: 240 K/UL
POTASSIUM SERPL-SCNC: 3.7 MMOL/L
PROT SERPL-MCNC: 7 G/DL
PT BLD: 11.1 SEC
RBC # BLD: 4.27 M/UL
RBC # FLD: 14.7 %
SODIUM SERPL-SCNC: 140 MMOL/L
WBC # FLD AUTO: 7.16 K/UL

## 2023-09-23 ENCOUNTER — APPOINTMENT (OUTPATIENT)
Dept: CT IMAGING | Facility: CLINIC | Age: 73
End: 2023-09-23
Payer: MEDICARE

## 2023-09-23 ENCOUNTER — OUTPATIENT (OUTPATIENT)
Dept: OUTPATIENT SERVICES | Facility: HOSPITAL | Age: 73
LOS: 1 days | End: 2023-09-23
Payer: MEDICARE

## 2023-09-23 DIAGNOSIS — R91.8 OTHER NONSPECIFIC ABNORMAL FINDING OF LUNG FIELD: ICD-10-CM

## 2023-09-23 DIAGNOSIS — Z98.890 OTHER SPECIFIED POSTPROCEDURAL STATES: Chronic | ICD-10-CM

## 2023-09-23 DIAGNOSIS — Z90.89 ACQUIRED ABSENCE OF OTHER ORGANS: Chronic | ICD-10-CM

## 2023-09-23 PROCEDURE — 71250 CT THORAX DX C-: CPT

## 2023-09-23 PROCEDURE — 71250 CT THORAX DX C-: CPT | Mod: 26,MH

## 2023-09-24 ENCOUNTER — NON-APPOINTMENT (OUTPATIENT)
Age: 73
End: 2023-09-24

## 2023-10-16 ENCOUNTER — NON-APPOINTMENT (OUTPATIENT)
Age: 73
End: 2023-10-16

## 2023-10-16 ENCOUNTER — OUTPATIENT (OUTPATIENT)
Dept: OUTPATIENT SERVICES | Facility: HOSPITAL | Age: 73
LOS: 1 days | End: 2023-10-16
Payer: MEDICARE

## 2023-10-16 ENCOUNTER — APPOINTMENT (OUTPATIENT)
Dept: PULMONOLOGY | Facility: CLINIC | Age: 73
End: 2023-10-16

## 2023-10-16 VITALS
WEIGHT: 177.03 LBS | SYSTOLIC BLOOD PRESSURE: 114 MMHG | RESPIRATION RATE: 20 BRPM | HEART RATE: 64 BPM | TEMPERATURE: 98 F | OXYGEN SATURATION: 98 % | HEIGHT: 72 IN | DIASTOLIC BLOOD PRESSURE: 73 MMHG

## 2023-10-16 DIAGNOSIS — Z98.890 OTHER SPECIFIED POSTPROCEDURAL STATES: Chronic | ICD-10-CM

## 2023-10-16 DIAGNOSIS — Z90.89 ACQUIRED ABSENCE OF OTHER ORGANS: Chronic | ICD-10-CM

## 2023-10-16 DIAGNOSIS — R91.8 OTHER NONSPECIFIC ABNORMAL FINDING OF LUNG FIELD: ICD-10-CM

## 2023-10-16 DIAGNOSIS — J44.9 CHRONIC OBSTRUCTIVE PULMONARY DISEASE, UNSPECIFIED: ICD-10-CM

## 2023-10-16 DIAGNOSIS — G47.33 OBSTRUCTIVE SLEEP APNEA (ADULT) (PEDIATRIC): ICD-10-CM

## 2023-10-16 DIAGNOSIS — Z86.79 PERSONAL HISTORY OF OTHER DISEASES OF THE CIRCULATORY SYSTEM: ICD-10-CM

## 2023-10-16 DIAGNOSIS — T78.40XS ALLERGY, UNSPECIFIED, SEQUELA: ICD-10-CM

## 2023-10-16 LAB
ALBUMIN SERPL ELPH-MCNC: 4.4 G/DL — SIGNIFICANT CHANGE UP (ref 3.3–5)
ALBUMIN SERPL ELPH-MCNC: 4.4 G/DL — SIGNIFICANT CHANGE UP (ref 3.3–5)
ALP SERPL-CCNC: 41 U/L — SIGNIFICANT CHANGE UP (ref 40–120)
ALP SERPL-CCNC: 41 U/L — SIGNIFICANT CHANGE UP (ref 40–120)
ALT FLD-CCNC: 11 U/L — SIGNIFICANT CHANGE UP (ref 4–41)
ALT FLD-CCNC: 11 U/L — SIGNIFICANT CHANGE UP (ref 4–41)
ANION GAP SERPL CALC-SCNC: 11 MMOL/L — SIGNIFICANT CHANGE UP (ref 7–14)
ANION GAP SERPL CALC-SCNC: 11 MMOL/L — SIGNIFICANT CHANGE UP (ref 7–14)
AST SERPL-CCNC: 14 U/L — SIGNIFICANT CHANGE UP (ref 4–40)
AST SERPL-CCNC: 14 U/L — SIGNIFICANT CHANGE UP (ref 4–40)
BILIRUB SERPL-MCNC: 0.5 MG/DL — SIGNIFICANT CHANGE UP (ref 0.2–1.2)
BILIRUB SERPL-MCNC: 0.5 MG/DL — SIGNIFICANT CHANGE UP (ref 0.2–1.2)
BUN SERPL-MCNC: 14 MG/DL — SIGNIFICANT CHANGE UP (ref 7–23)
BUN SERPL-MCNC: 14 MG/DL — SIGNIFICANT CHANGE UP (ref 7–23)
CALCIUM SERPL-MCNC: 9.7 MG/DL — SIGNIFICANT CHANGE UP (ref 8.4–10.5)
CALCIUM SERPL-MCNC: 9.7 MG/DL — SIGNIFICANT CHANGE UP (ref 8.4–10.5)
CHLORIDE SERPL-SCNC: 105 MMOL/L — SIGNIFICANT CHANGE UP (ref 98–107)
CHLORIDE SERPL-SCNC: 105 MMOL/L — SIGNIFICANT CHANGE UP (ref 98–107)
CO2 SERPL-SCNC: 22 MMOL/L — SIGNIFICANT CHANGE UP (ref 22–31)
CO2 SERPL-SCNC: 22 MMOL/L — SIGNIFICANT CHANGE UP (ref 22–31)
CREAT SERPL-MCNC: 1.04 MG/DL — SIGNIFICANT CHANGE UP (ref 0.5–1.3)
CREAT SERPL-MCNC: 1.04 MG/DL — SIGNIFICANT CHANGE UP (ref 0.5–1.3)
EGFR: 75 ML/MIN/1.73M2 — SIGNIFICANT CHANGE UP
EGFR: 75 ML/MIN/1.73M2 — SIGNIFICANT CHANGE UP
GLUCOSE SERPL-MCNC: 77 MG/DL — SIGNIFICANT CHANGE UP (ref 70–99)
GLUCOSE SERPL-MCNC: 77 MG/DL — SIGNIFICANT CHANGE UP (ref 70–99)
HCT VFR BLD CALC: 43.4 % — SIGNIFICANT CHANGE UP (ref 39–50)
HGB BLD-MCNC: 14.7 G/DL — SIGNIFICANT CHANGE UP (ref 13–17)
MCHC RBC-ENTMCNC: 32.9 PG — SIGNIFICANT CHANGE UP (ref 27–34)
MCHC RBC-ENTMCNC: 33.9 GM/DL — SIGNIFICANT CHANGE UP (ref 32–36)
MCV RBC AUTO: 97.1 FL — SIGNIFICANT CHANGE UP (ref 80–100)
NRBC # BLD: 0 /100 WBCS — SIGNIFICANT CHANGE UP (ref 0–0)
NRBC # FLD: 0 K/UL — SIGNIFICANT CHANGE UP (ref 0–0)
PLATELET # BLD AUTO: 305 K/UL — SIGNIFICANT CHANGE UP (ref 150–400)
POTASSIUM SERPL-MCNC: 4.1 MMOL/L — SIGNIFICANT CHANGE UP (ref 3.5–5.3)
POTASSIUM SERPL-MCNC: 4.1 MMOL/L — SIGNIFICANT CHANGE UP (ref 3.5–5.3)
POTASSIUM SERPL-SCNC: 4.1 MMOL/L — SIGNIFICANT CHANGE UP (ref 3.5–5.3)
POTASSIUM SERPL-SCNC: 4.1 MMOL/L — SIGNIFICANT CHANGE UP (ref 3.5–5.3)
PROT SERPL-MCNC: 7.6 G/DL — SIGNIFICANT CHANGE UP (ref 6–8.3)
PROT SERPL-MCNC: 7.6 G/DL — SIGNIFICANT CHANGE UP (ref 6–8.3)
RBC # BLD: 4.47 M/UL — SIGNIFICANT CHANGE UP (ref 4.2–5.8)
RBC # FLD: 14.4 % — SIGNIFICANT CHANGE UP (ref 10.3–14.5)
SODIUM SERPL-SCNC: 138 MMOL/L — SIGNIFICANT CHANGE UP (ref 135–145)
SODIUM SERPL-SCNC: 138 MMOL/L — SIGNIFICANT CHANGE UP (ref 135–145)
WBC # BLD: 5.33 K/UL — SIGNIFICANT CHANGE UP (ref 3.8–10.5)
WBC # FLD AUTO: 5.33 K/UL — SIGNIFICANT CHANGE UP (ref 3.8–10.5)

## 2023-10-16 PROCEDURE — 93010 ELECTROCARDIOGRAM REPORT: CPT

## 2023-10-16 RX ORDER — OXYCODONE HYDROCHLORIDE 5 MG/1
0.5 TABLET ORAL
Qty: 0 | Refills: 0 | DISCHARGE

## 2023-10-16 RX ORDER — SODIUM CHLORIDE 9 MG/ML
1000 INJECTION, SOLUTION INTRAVENOUS
Refills: 0 | Status: DISCONTINUED | OUTPATIENT
Start: 2023-10-24 | End: 2023-11-07

## 2023-10-16 RX ORDER — FLUTICASONE PROPIONATE AND SALMETEROL 50; 250 UG/1; UG/1
1 POWDER ORAL; RESPIRATORY (INHALATION)
Qty: 0 | Refills: 0 | DISCHARGE

## 2023-10-16 NOTE — H&P PST ADULT - HISTORY OF PRESENT ILLNESS
This is a 73 y.o. male with chronic obstructive pulmonary disease unspecified , other nonspecific abnormal finding of lung field . Pt had Ct of chest don, Pet scan done . Pt is scheduled for surgery 10/24/23 .

## 2023-10-16 NOTE — H&P PST ADULT - FUNCTIONAL STATUS
walks 1/4 mile daily, stairs 1-2 flights daily , dyspnea with exertion recovers over 2-3 min ; cardiac clearance from Dr Alvarado/4-10 METS

## 2023-10-16 NOTE — H&P PST ADULT - ENMT COMMENTS
denies loose teeth has had teeth removed  , has pins in place did not use partial ; 4 implants in place

## 2023-10-16 NOTE — H&P PST ADULT - NEGATIVE ENDOCRINE SYMPTOMS
voice" a little hoarse with use of inhalers " as stated by pt/no cold intolerance/no heat intolerance

## 2023-10-16 NOTE — H&P PST ADULT - PROBLEM SELECTOR PLAN 1
Scheduled for monarch robotic neurally adjusted ventilatory assist ,endobronchial ultrasound Bronch /radial endobronchial ultrasound bronch /radial endobronchial ultrasound /linear   Preop instructions provided and patient verbalizes understanding.  Labs done and results pending.  Pt instructed to take inhalers as prescribed and bring to hospital , lungs clear , O2 sat 98% on room air.   Medical/ cardiac clearance from Dr Alvarado , as per Dr Lomeli and pst due to cardiomyopathy and dyspnea with exertion .

## 2023-10-16 NOTE — H&P PST ADULT - NSICDXPASTSURGICALHX_GEN_ALL_CORE_FT
PAST SURGICAL HISTORY:  S/P bronchoscopy     S/P tonsillectomy     Status post medial meniscus repair

## 2023-10-16 NOTE — H&P PST ADULT - PROBLEM SELECTOR PLAN 2
cardiac clearance, Dr Alvarado to fax. Pt instructed to entresto day of surgery . cardiac clearance, Dr Alvarado to fax, requesting EKG comparison and last echo report.  Pt instructed to entresto day of surgery .

## 2023-10-16 NOTE — H&P PST ADULT - ASSESSMENT
chronic obstructive pulmonary diseases unspecified   other nonspecific abnormal finding of lung field

## 2023-10-16 NOTE — H&P PST ADULT - ALLERGY TYPES
seasonal , pet dander - allergic rhinitis/outdoor environmental allergies/indoor environmental allergies

## 2023-10-23 NOTE — ASU PATIENT PROFILE, ADULT - NS PRO ABUSE SCREEN SUSPICION NEGLECT YN
[facility] Surgery  Discharge Note  Short Stay    Procedure(s) (LRB):  LUMBAR DISCOGRAPHY DIAGNOSTIC L2-L3 DISCOGRAM WITH 4% LIDOCAINE ONLY (N/A)      OUTCOME: Patient tolerated treatment/procedure well without complication and is now ready for discharge.    DISPOSITION: Home or Self Care    FINAL DIAGNOSIS:  Lumbar discogenic pain    FOLLOWUP: In clinic    DISCHARGE INSTRUCTIONS:    Discharge Procedure Orders   Diet general     Call MD for:  temperature >100.4     Call MD for:  persistent nausea and vomiting     Call MD for:  severe uncontrolled pain     Call MD for:  difficulty breathing, headache or visual disturbances     Call MD for:  redness, tenderness, or signs of infection (pain, swelling, redness, odor or green/yellow discharge around incision site)     Call MD for:  hives     Call MD for:  persistent dizziness or light-headedness     Call MD for:  extreme fatigue        TIME SPENT ON DISCHARGE: 30 minutes   no

## 2023-10-23 NOTE — ASU PATIENT PROFILE, ADULT - FALL HARM RISK - RISK INTERVENTIONS

## 2023-10-24 ENCOUNTER — RESULT REVIEW (OUTPATIENT)
Age: 73
End: 2023-10-24

## 2023-10-24 ENCOUNTER — APPOINTMENT (OUTPATIENT)
Dept: PULMONOLOGY | Facility: HOSPITAL | Age: 73
End: 2023-10-24

## 2023-10-24 ENCOUNTER — OUTPATIENT (OUTPATIENT)
Dept: OUTPATIENT SERVICES | Facility: HOSPITAL | Age: 73
LOS: 1 days | Discharge: HOME | End: 2023-10-24
Payer: MEDICARE

## 2023-10-24 ENCOUNTER — TRANSCRIPTION ENCOUNTER (OUTPATIENT)
Age: 73
End: 2023-10-24

## 2023-10-24 VITALS
SYSTOLIC BLOOD PRESSURE: 165 MMHG | HEART RATE: 51 BPM | HEIGHT: 73 IN | RESPIRATION RATE: 14 BRPM | OXYGEN SATURATION: 99 % | DIASTOLIC BLOOD PRESSURE: 74 MMHG | TEMPERATURE: 98 F | WEIGHT: 175.05 LBS

## 2023-10-24 VITALS
SYSTOLIC BLOOD PRESSURE: 129 MMHG | HEART RATE: 79 BPM | DIASTOLIC BLOOD PRESSURE: 59 MMHG | RESPIRATION RATE: 18 BRPM | OXYGEN SATURATION: 97 % | TEMPERATURE: 98 F

## 2023-10-24 DIAGNOSIS — R91.8 OTHER NONSPECIFIC ABNORMAL FINDING OF LUNG FIELD: ICD-10-CM

## 2023-10-24 DIAGNOSIS — Z90.89 ACQUIRED ABSENCE OF OTHER ORGANS: Chronic | ICD-10-CM

## 2023-10-24 DIAGNOSIS — Z98.890 OTHER SPECIFIED POSTPROCEDURAL STATES: Chronic | ICD-10-CM

## 2023-10-24 PROCEDURE — 31627 NAVIGATIONAL BRONCHOSCOPY: CPT | Mod: GC

## 2023-10-24 PROCEDURE — 88112 CYTOPATH CELL ENHANCE TECH: CPT | Mod: 26

## 2023-10-24 PROCEDURE — 31628 BRONCHOSCOPY/LUNG BX EACH: CPT | Mod: GC

## 2023-10-24 PROCEDURE — 88333 PATH CONSLTJ SURG CYTO XM 1: CPT | Mod: 26

## 2023-10-24 PROCEDURE — 31624 DX BRONCHOSCOPE/LAVAGE: CPT | Mod: GC

## 2023-10-24 PROCEDURE — 31645 BRNCHSC W/THER ASPIR 1ST: CPT | Mod: GC

## 2023-10-24 PROCEDURE — 71045 X-RAY EXAM CHEST 1 VIEW: CPT | Mod: 26

## 2023-10-24 PROCEDURE — 31629 BRONCHOSCOPY/NEEDLE BX EACH: CPT | Mod: GC

## 2023-10-24 PROCEDURE — 31654 BRONCH EBUS IVNTJ PERPH LES: CPT | Mod: GC

## 2023-10-24 PROCEDURE — 88305 TISSUE EXAM BY PATHOLOGIST: CPT | Mod: 26

## 2023-10-24 PROCEDURE — 88312 SPECIAL STAINS GROUP 1: CPT | Mod: 26

## 2023-10-24 RX ORDER — ALBUTEROL 90 UG/1
2 AEROSOL, METERED ORAL
Refills: 0 | DISCHARGE

## 2023-10-24 RX ORDER — FENTANYL CITRATE 50 UG/ML
25 INJECTION INTRAVENOUS
Refills: 0 | Status: DISCONTINUED | OUTPATIENT
Start: 2023-10-24 | End: 2023-10-24

## 2023-10-24 RX ORDER — FINASTERIDE 5 MG/1
1 TABLET, FILM COATED ORAL
Qty: 0 | Refills: 0 | DISCHARGE

## 2023-10-24 RX ORDER — SACUBITRIL AND VALSARTAN 24; 26 MG/1; MG/1
1 TABLET, FILM COATED ORAL
Qty: 0 | Refills: 0 | DISCHARGE

## 2023-10-24 RX ORDER — ONDANSETRON 8 MG/1
4 TABLET, FILM COATED ORAL ONCE
Refills: 0 | Status: DISCONTINUED | OUTPATIENT
Start: 2023-10-24 | End: 2023-11-07

## 2023-10-24 RX ORDER — BUDESONIDE, MICRONIZED 100 %
2 POWDER (GRAM) MISCELLANEOUS
Refills: 0 | DISCHARGE

## 2023-10-24 RX ORDER — FENTANYL CITRATE 50 UG/ML
50 INJECTION INTRAVENOUS
Refills: 0 | Status: DISCONTINUED | OUTPATIENT
Start: 2023-10-24 | End: 2023-10-24

## 2023-10-24 RX ORDER — TAMSULOSIN HYDROCHLORIDE 0.4 MG/1
1 CAPSULE ORAL
Qty: 0 | Refills: 0 | DISCHARGE

## 2023-10-24 NOTE — ASU DISCHARGE PLAN (ADULT/PEDIATRIC) - CARE PROVIDER_API CALL
Yariel Lomeli  Pulmonary Disease  [address] [place] [address]  Phone: [phone]  Fax: [phone]  Established Patient  Follow Up Time: 2 weeks

## 2023-10-24 NOTE — ASU PREOP CHECKLIST - PATIENT PROBLEMS/NEEDS
Render Post-Care Instructions In Note?: no Detail Level: Detailed Consent: The patient's consent was obtained including but not limited to risks of crusting, scabbing, blistering, scarring, darker or lighter pigmentary change, recurrence, incomplete removal and infection. Duration Of Freeze Thaw-Cycle (Seconds): 0 Post-Care Instructions: I reviewed with the patient in detail post-care instructions. Patient is to wear sunprotection, and avoid picking at any of the treated lesions. Pt may apply Vaseline to crusted or scabbing areas. Patient expressed no known problems or needs

## 2023-10-24 NOTE — ASU DISCHARGE PLAN (ADULT/PEDIATRIC) - NS MD DC FALL RISK RISK
For information on Fall & Injury Prevention, visit: [email or web address] OR  [email or web address] OR  https://www.cdc.gov/steadi/patient.html

## 2023-10-24 NOTE — ASU DISCHARGE PLAN (ADULT/PEDIATRIC) - CALL YOUR DOCTOR IF YOU HAVE ANY OF THE FOLLOWING:
Bleeding that does not stop/Pain not relieved by Medications Bleeding that does not stop/Pain not relieved by Medications/Nausea and vomiting that does not stop/Inability to tolerate liquids or foods

## 2023-10-24 NOTE — ASU DISCHARGE PLAN (ADULT/PEDIATRIC) - NURSING INSTRUCTIONS
You were given 1000mg IV Tylenol for pain management.  Please DO NOT take any Tylenol containing products, such as  Vicodin, Percocet, Excedrin, many cold preparations for the next 6 hours (until  __6_ PM).  DO NOT EXCEED 4000MG OF TYLENOL OVER 24 HOURS.

## 2023-10-24 NOTE — ASU DISCHARGE PLAN (ADULT/PEDIATRIC) - ASU DC SPECIAL INSTRUCTIONSFT
some post operative cough with trace amounts of blood is normal. if you experience any chest pain, shortness of breath or significant blood in your cough please call the office and go to the emergency room    please follow up results with Dr. Lomeli in 2-4 weeks    Pulmonary/Sleep Clinic  [address] [place] [address]  [phone]

## 2023-10-24 NOTE — ASU DISCHARGE PLAN (ADULT/PEDIATRIC) - FOLLOW UP APPOINTMENTS
120 May also call Recovery Room (PACU) 24/7 @ [phone]/[facility]

## 2023-10-25 ENCOUNTER — APPOINTMENT (OUTPATIENT)
Dept: PULMONOLOGY | Facility: CLINIC | Age: 73
End: 2023-10-25

## 2023-10-25 LAB
GRAM STN FLD: SIGNIFICANT CHANGE UP
GRAM STN FLD: SIGNIFICANT CHANGE UP
SPECIMEN SOURCE: SIGNIFICANT CHANGE UP
SPECIMEN SOURCE: SIGNIFICANT CHANGE UP

## 2023-10-26 LAB
FUNGITELL B-D-GLUCAN,  BRONCHIAL LAVAGE: SIGNIFICANT CHANGE UP
FUNGITELL B-D-GLUCAN,  BRONCHIAL LAVAGE: SIGNIFICANT CHANGE UP
GRAM STN FLD: ABNORMAL
GRAM STN FLD: ABNORMAL

## 2023-10-27 RX ORDER — ALBUTEROL SULFATE 90 UG/1
108 (90 BASE) INHALANT RESPIRATORY (INHALATION)
Qty: 1 | Refills: 5 | Status: ACTIVE | COMMUNITY
Start: 2023-10-27 | End: 1900-01-01

## 2023-10-28 LAB
-  CEFTRIAXONE (MENINGITIDIS): SIGNIFICANT CHANGE UP
-  CEFTRIAXONE (MENINGITIDIS): SIGNIFICANT CHANGE UP
-  CEFTRIAXONE (NON-MENINGITIDIS): SIGNIFICANT CHANGE UP
-  CEFTRIAXONE (NON-MENINGITIDIS): SIGNIFICANT CHANGE UP
-  ERYTHROMYCIN: SIGNIFICANT CHANGE UP
-  ERYTHROMYCIN: SIGNIFICANT CHANGE UP
-  LEVOFLOXACIN: SIGNIFICANT CHANGE UP
-  LEVOFLOXACIN: SIGNIFICANT CHANGE UP
-  PENICILLIN (MENINGITIDIS): SIGNIFICANT CHANGE UP
-  PENICILLIN (MENINGITIDIS): SIGNIFICANT CHANGE UP
-  PENICILLIN (NON-MENINGITIDIS): SIGNIFICANT CHANGE UP
-  PENICILLIN (NON-MENINGITIDIS): SIGNIFICANT CHANGE UP
-  PENICILLIN (ORAL PENICILLIN V): SIGNIFICANT CHANGE UP
-  PENICILLIN (ORAL PENICILLIN V): SIGNIFICANT CHANGE UP
-  TRIMETHOPRIM/SULFAMETHOXAZOLE: SIGNIFICANT CHANGE UP
-  TRIMETHOPRIM/SULFAMETHOXAZOLE: SIGNIFICANT CHANGE UP
-  VANCOMYCIN: SIGNIFICANT CHANGE UP
-  VANCOMYCIN: SIGNIFICANT CHANGE UP
METHOD TYPE: SIGNIFICANT CHANGE UP
METHOD TYPE: SIGNIFICANT CHANGE UP

## 2023-10-29 LAB
CULTURE RESULTS: ABNORMAL
CULTURE RESULTS: ABNORMAL
ORGANISM # SPEC MICROSCOPIC CNT: ABNORMAL
SPECIMEN SOURCE: SIGNIFICANT CHANGE UP
SPECIMEN SOURCE: SIGNIFICANT CHANGE UP

## 2023-10-31 LAB
GALACTOMANNAN AG SERPL-ACNC: 0.16 INDEX — SIGNIFICANT CHANGE UP (ref 0–0.49)
GALACTOMANNAN AG SERPL-ACNC: 0.16 INDEX — SIGNIFICANT CHANGE UP (ref 0–0.49)

## 2023-11-01 LAB
NON-GYNECOLOGICAL CYTOLOGY STUDY: SIGNIFICANT CHANGE UP
NON-GYNECOLOGICAL CYTOLOGY STUDY: SIGNIFICANT CHANGE UP

## 2023-11-06 ENCOUNTER — APPOINTMENT (OUTPATIENT)
Dept: PULMONOLOGY | Facility: CLINIC | Age: 73
End: 2023-11-06
Payer: MEDICARE

## 2023-11-06 ENCOUNTER — NON-APPOINTMENT (OUTPATIENT)
Age: 73
End: 2023-11-06

## 2023-11-06 PROCEDURE — 99442: CPT | Mod: 95

## 2023-11-15 ENCOUNTER — APPOINTMENT (OUTPATIENT)
Dept: PULMONOLOGY | Facility: CLINIC | Age: 73
End: 2023-11-15
Payer: MEDICARE

## 2023-11-15 VITALS
HEIGHT: 73 IN | HEART RATE: 79 BPM | DIASTOLIC BLOOD PRESSURE: 74 MMHG | WEIGHT: 174 LBS | OXYGEN SATURATION: 98 % | BODY MASS INDEX: 23.06 KG/M2 | SYSTOLIC BLOOD PRESSURE: 138 MMHG | TEMPERATURE: 97.9 F

## 2023-11-15 DIAGNOSIS — R91.8 OTHER NONSPECIFIC ABNORMAL FINDING OF LUNG FIELD: ICD-10-CM

## 2023-11-15 PROCEDURE — 99213 OFFICE O/P EST LOW 20 MIN: CPT

## 2023-12-17 ENCOUNTER — NON-APPOINTMENT (OUTPATIENT)
Age: 73
End: 2023-12-17

## 2024-01-03 ENCOUNTER — APPOINTMENT (OUTPATIENT)
Dept: PULMONOLOGY | Facility: CLINIC | Age: 74
End: 2024-01-03
Payer: MEDICARE

## 2024-01-03 VITALS
SYSTOLIC BLOOD PRESSURE: 104 MMHG | HEART RATE: 69 BPM | BODY MASS INDEX: 23.46 KG/M2 | TEMPERATURE: 98.2 F | WEIGHT: 177 LBS | OXYGEN SATURATION: 97 % | DIASTOLIC BLOOD PRESSURE: 47 MMHG | HEIGHT: 73 IN

## 2024-01-03 DIAGNOSIS — J44.9 CHRONIC OBSTRUCTIVE PULMONARY DISEASE, UNSPECIFIED: ICD-10-CM

## 2024-01-03 DIAGNOSIS — J47.9 BRONCHIECTASIS, UNCOMPLICATED: ICD-10-CM

## 2024-01-03 DIAGNOSIS — R91.8 OTHER NONSPECIFIC ABNORMAL FINDING OF LUNG FIELD: ICD-10-CM

## 2024-01-03 PROCEDURE — 99214 OFFICE O/P EST MOD 30 MIN: CPT

## 2024-01-04 PROBLEM — J47.9 BRONCHIECTASIS: Status: ACTIVE | Noted: 2021-08-11

## 2024-01-04 PROBLEM — R91.8 ABNORMAL CT SCAN OF LUNG: Status: ACTIVE | Noted: 2022-09-01

## 2024-01-04 NOTE — PROCEDURE
[FreeTextEntry1] : CAT scan of the chest of December 18, 2023 reviewed discussed and compared to prior. Right upper lobe density decreased in size. New left lower lobe pulmonary nodule. Small stable pulmonary nodules.  08/23/2023 Pulmonary function testing Normal Flow Rates Normal Lung Volumes. There is a mild diffusion impairment. Corrects to normal with lung volume correction  Compared to August 2022 there is a very mild decrease in function.   Bronchoscopy results noted.  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  Ct chest August 16th, 2023, reviewed and discussed with patient.  Compared to prior. Progression of disease in right upper lobe although distribution somewhat different.  2/13/23 PET scan  Uptake RUL density Esophageal and prostate uptake

## 2024-01-04 NOTE — HISTORY OF PRESENT ILLNESS
[Current] : current [TextBox_4] : Baseline BARRERA No significant change in status. Seeing cardiology. Has not gone for endoscopy, colonoscopy or followed up with GI. Had follow-up CT. Using nebulizer with DuoNeb and budesonide 1 to twice a day.  Positive response. Continues to smoke.

## 2024-01-04 NOTE — ASSESSMENT
[FreeTextEntry1] : Follow-up CAT scan of the chest 6 months from prior.  Task sent as reminder. Discontinue smoking. cont nebulizer medication DuoNeb and budesonide bid,  Recc. seeing GI, reinforce to go cont to f/u with cardio  35 minutes spent in evaluation review of studies and management.

## 2024-01-04 NOTE — DISCUSSION/SUMMARY
[FreeTextEntry1] : Previously increasing right upper lobe density now decreased in size.  Prior BAL positive for NTM and rare Aspergillus.  Repeat bronchoscopy again negative for malignancy.  Fungal assay negative.  Do not see acid-fast cultures.  Right upper lobe abnormality may be related to atypical mycobacterial disease.  Will follow. Chronic obstructive pulmonary disease. Current every day smoker. pulmonary nodules.  1 new nodule. mild bronchiectasis  Dyspnea on exertion likely multifactorial related to underlying pulmonary and cardiac disease. Uptake in prostate gland on pet imaging followed by urology. Uptake in esophagus recommended GI evaluation.

## 2024-05-01 ENCOUNTER — APPOINTMENT (OUTPATIENT)
Dept: PULMONOLOGY | Facility: CLINIC | Age: 74
End: 2024-05-01

## 2024-07-26 ENCOUNTER — NON-APPOINTMENT (OUTPATIENT)
Age: 74
End: 2024-07-26

## 2024-10-02 NOTE — REASON FOR VISIT
This is a main complaint during this visit.  She is known to have right hip advanced arthritis as per the previous x-rays.  I advised the patient to keep the follow-up appointment with the orthopedic and if he offers to have further injection to talk to him about the last time limited relief and it is advisable to get the injection if it could be done.  At her age and other comorbid conditions she is not a candidate for hip replacement or any other surgeries.  Till the injection is done we discussed about the treatment options.  She is high risk for any nonsteroidal anti-inflammatory medication and because of the diabetes we will avoid the oral steroids.  Will try local diclofenac gel to see whether that will at least decrease some pain.   [Follow-Up] : a follow-up visit [Abnormal CXR/ Chest CT] : an abnormal CXR/ chest CT [COPD] : COPD [Bronchiectasis] : bronchiectasis

## 2025-03-27 NOTE — ASU PATIENT PROFILE, ADULT - FALL HARM RISK - FACTORS
Called and spoke with patient regarding wound care referral to get clarification since he is being treated by podiatry with weekly appointments.  Patient advised they discuss hyperbarics and this would help his wound heal faster.  I advised the patient that we do not have [place] [facility] and that service is provided at [facility].  I also didn't see any notation regarding hyperbarics in his clinic encounter note.  Patient will discuss with Dr. Ellison during appointment next week.   
pt has back issues

## (undated) DEVICE — GLV 7 PROTEXIS (WHITE)

## (undated) DEVICE — PACK BRONCHOSCOPY

## (undated) DEVICE — WARMING BLANKET LOWER ADULT

## (undated) DEVICE — MASK SURGICAL WITH EYESHIELD ANTIFOG (ORANGE)

## (undated) DEVICE — ADAPTER FIBEROPTIC BRONCHOSCOPE DUAL AXIS SWIVEL

## (undated) DEVICE — BALLOON SINGLE FOR BF-UC160F

## (undated) DEVICE — SYR LUER LOK 10CC

## (undated) DEVICE — VALVE SHEATH BRONCHOSCOPE STRL

## (undated) DEVICE — SOL IRR POUR NS 0.9% 500ML

## (undated) DEVICE — SYR LUER LOK 20CC

## (undated) DEVICE — TRAP SPECIMEN SPUTUM 40CC

## (undated) DEVICE — NDL ASPIRATION VIZISHOT2 22G

## (undated) DEVICE — KIT INTRODUCER MONARCH BRONCHOSCOPE

## (undated) DEVICE — TUBING FLUDICS MONARCH BRONCHOSCOPE

## (undated) DEVICE — STOPCOCK 4-WAY (BLUE) DISCOFIX SPIN-LOCK CONNECTOR

## (undated) DEVICE — BIOPSY FORCEP J&J MONARCH SMOOTH CUP

## (undated) DEVICE — SYR LUER SLIP TIP 30CC

## (undated) DEVICE — DRSG TAPE TRANSPORE 1"

## (undated) DEVICE — DRSG CURITY GAUZE SPONGE 4 X 4" 12-PLY

## (undated) DEVICE — FORCEP BIOPSY BRONCHOSCOPE DISP

## (undated) DEVICE — SUTURE REMOVAL KIT

## (undated) DEVICE — SOL INJ NS 0.9% 100ML

## (undated) DEVICE — BRONCHOSCOPE ALCON MONARCH DISP

## (undated) DEVICE — VALVE BIOPSY BRONCHOVIDEOSCOPE

## (undated) DEVICE — VALVE SUCTION EVIS 160/200/240

## (undated) DEVICE — BRUSH CYTO DISP

## (undated) DEVICE — DRAPE TOWEL BLUE 17" X 24"